# Patient Record
Sex: MALE | Race: WHITE
[De-identification: names, ages, dates, MRNs, and addresses within clinical notes are randomized per-mention and may not be internally consistent; named-entity substitution may affect disease eponyms.]

---

## 2019-10-28 NOTE — US
Limited abdominal ultrasound: Multiple real-time images of the right upper 
abdomen were obtained.



Liver shows no focal abnormality.  Pancreas is incompletely seen.  Visualized 
portions of the pancreas appear within normal limits.  Gallbladder shows no 
shadowing gallstones.  No gallbladder wall thickening or biliary duct 
dilatation is seen.  Right kidney shows no hydronephrosis or mass.  Right 
kidney length is 12.2 cm.



Impression: No abnormality is appreciated on right upper quadrant abdominal 
ultrasound exam.



Diagnostic code #1
MTDD

## 2019-10-28 NOTE — CR
Chest: Portable view of the chest was obtained.



Comparison: Prior chest x-ray 09/15/16.



Linear densities are seen within the right lung base most likely due to 
atelectasis.  Lungs otherwise are clear.  Heart size is within normal limits 
for portable technique.  Thoracic aorta appears within normal limits.  Slight 
degenerative change is noted within both shoulders as well as endplate spurring 
scattered within the spine.



Impression:

1.  Right basilar atelectasis.

2.  Nothing acute is otherwise seen.



Diagnostic code #2
MTDD

## 2019-10-28 NOTE — EDM.PDOC
ED HPI GENERAL MEDICAL PROBLEM





- General


Stated Complaint: UPPER ABDOMEN/CHEST PAIN


Time Seen by Provider: 10/28/19 10:11


Source of Information: Reports: Patient


History Limitations: Reports: No Limitations





- History of Present Illness


INITIAL COMMENTS - FREE TEXT/NARRATIVE: 


HISTORY AND PHYSICAL:





History of present illness:


Patient is a 74-year-old male who presents to the emergency room today with 

complaints of epigastric/chest pain. He states he has had three episodes, with 

the first one starting on Friday - which lasted anywhere from 15 minutes to an 

hour. Denies the pain being precipitated by eating or drinking. Reports that 

the area does feel tender during the episode. Has associated diaphoresis, nausea

, and dry heaves. Patient reports when the pain started on Friday he did call 

his son who is a physician, he encouraged him to take some omeprazole. Patient 

states he did take the omeprazole on Saturday evening. Today he went to 

breakfast and had decaf coffee and pancake; shortly after started having pain. 

Patient states he is concerned it could be his gallbladder; reports he has 

known gallstones but has never had any "real problem" with them before. Patient 

reports he is currently pain free.





Patient has a known history of atrial fibrillation (on warfarin) and previous 

stent placement.


Patient denies any fever, chills, headache, change in vision, syncope or near 

syncope. Denies any back pain, shortness of breath or cough. Denies any 

abdominal pain, nausea, vomiting, diarrhea, constipation or dysuria. Has not 

noted any blood in urine or stool. Patient has been eating and drinking 

appropriately.





Review of systems: 


As per history of present illness and below otherwise all systems reviewed and 

negative.





Past medical history: 


As per history of present illness and as reviewed below otherwise 

noncontributory.





Surgical history: 


As per history of present illness and as reviewed below otherwise 

noncontributory.





Social history: 


See social history for further information





Family history: 


As per history of present illness and as reviewed below otherwise 

noncontributory.





Physical exam:


General: Well developed and well nourished 74-year-old male. Alert and 

oriented. Nontoxic appearing and in no acute distress.


HEENT: Atraumatic, normocephalic, pupils equal and reactive bilaterally, 

negative for conjunctival pallor or scleral icterus, mucous membranes moist, 

trachea midline. No drooling or trismus noted. No meningeal signs. No hot 

potato voice noted. 


Lungs: Clear to auscultation, breath sounds equal bilaterally, chest nontender.


Heart: S1S2, regular rate and rhythm without overt murmur


Abdomen: Soft, nondistended, nontender. Negative for masses or 

hepatosplenomegaly. Negative for costovertebral tenderness.


Pelvis: Stable nontender.


Skin: Intact, warm, dry. No lesions or rashes noted.


Extremities: Atraumatic, moves all extremities per self without difficulty or 

deficits, negative for cords or calf pain. Neurovascular unremarkable.


Neuro: Awake, alert, oriented. Cranial nerves II through XII unremarkable. 

Cerebellum unremarkable. Motor and sensory unremarkable throughout. Exam 

nonfocal.





Notes:


Chest x-ray shows right basilar atelectasis. Nothing acute is otherwise seen. 

Ultrasound shows no acute findings. EKG shows the atrial fibrillation. 


Dr Garcia, hospitalist on call, was contacted on this patient. She will admit 

for observation for further care/management. Patient is aware and agreeable.  





Diagnostics:


CBC, CMP, Troponin, EKG, INR, CXR, Gallbladder US





Therapeutics:


NS at 125ml/hr, Protonix





Impression: 


Chest Pain


History of Atrial Fib


Epigastric Pain





Plan:


Observation admission to med/surg





Definitive disposition and diagnosis as appropriate pending reevaluation and 

review of above.








- Related Data


 Allergies











Allergy/AdvReac Type Severity Reaction Status Date / Time


 


latex Allergy  Itching Verified 10/28/19 13:26


 


oxycodone HCl Allergy  Stomach Verified 10/28/19 13:26





[From OxyContin]   Upset  











Home Meds: 


 Home Meds





Carvedilol [Coreg] 12.5 mg PO BID 03/21/16 [History]


Clopidogrel [Plavix] 75 mg PO DAILY 03/21/16 [History]


Nitroglycerin [Nitrostat] 0.4 mg PO Q5M PRN 03/21/16 [History]


Warfarin [Coumadin] 2.5 mg PO .MONTHUR 03/21/16 [History]


Warfarin [Coumadin] 5 mg PO .[id number] 03/21/16 [History]


atorvaSTATin [Lipitor] 40 mg PO BEDTIME 03/21/16 [History]


Oxybutynin 5 mg PO QAM 10/28/19 [History]











Past Medical History


Cardiovascular History: Reports: High Cholesterol, Hypertension, MI, Stents


Respiratory History: Reports: None


Gastrointestinal History: Reports: None


Genitourinary History: Reports: None


Neurological History: Reports: None


Psychiatric History: Reports: None


Endocrine/Metabolic History: Reports: None


Hematologic History: Reports: None


Immunologic History: Reports: None


Dermatologic History: Reports: None





- Past Surgical History


Head Surgeries/Procedures: Reports: None


HEENT Surgical History: Reports: Tonsillectomy


Musculoskeletal Surgical History: Reports: ORIF


Other Musculoskeletal Surgeries/Procedures:: R hip





ED ROS GENERAL





- Review of Systems


Review Of Systems: ROS reveals no pertinent complaints other than HPI.





ED EXAM, GENERAL





- Physical Exam


Exam: See Below (See dictation)





Course





- Vital Signs


Last Recorded V/S: 


 Last Vital Signs











Temp  97.9 F   10/28/19 12:03


 


Pulse  104 H  10/28/19 12:03


 


Resp  18   10/28/19 12:03


 


BP  102/63   10/28/19 12:03


 


Pulse Ox  98   10/28/19 12:03














- Orders/Labs/Meds


Orders: 


 Active Orders 24 hr











 Category Date Time Status


 


 Admission Status [Patient Status] [ADT] Stat ADT  10/28/19 11:36 Active


 


 Cardiac Monitoring [RC] .AS DIRECTED Care  10/28/19 11:36 Active


 


 Sodium Chloride 0.9% [Saline Flush] Med  10/28/19 10:16 Active





 10 ml FLUSH ASDIRECTED PRN   


 


 Sodium Chloride 0.9% [Saline Flush] Med  10/28/19 10:16 Active





 2.5 ml FLUSH ASDIRECTED PRN   


 


 Saline Lock Insert [OM.PC] Stat Oth  10/28/19 10:16 Ordered








 Medication Orders





Acetaminophen (Tylenol)  650 mg PO Q4H PRN


   PRN Reason: Pain (Mild 1-3)/fever


Atorvastatin Calcium (Lipitor)  40 mg PO BEDTIME [facility]O


Carvedilol (Coreg)  12.5 mg PO BID RAMO


Clopidogrel Bisulfate (Plavix)  75 mg PO DAILY [facility]O


Morphine Sulfate (Morphine)  2 mg IVPUSH Q2H PRN


   PRN Reason: Pain (severe 7-10)


   Stop: 10/29/19 12:02


Nitroglycerin (Nitrostat)  0.4 mg SL Q5M PRN


   PRN Reason: Chest Pain


Ondansetron HCl (Zofran Odt)  4 mg PO Q4H PRN


   PRN Reason: nausea, able to take PO


Ondansetron HCl (Zofran)  4 mg IVPUSH Q4H PRN


   PRN Reason: Nausea


Oxybutynin Chloride (Oxybutynin)  5 mg PO DAILY [facility]


Pantoprazole Sodium (Protonix***)  40 mg PO ACBREAKFAST [facility]


Sodium Chloride (Saline Flush)  10 ml FLUSH ASDIRECTED PRN


   PRN Reason: Keep Vein Open


Sodium Chloride (Saline Flush)  2.5 ml FLUSH ASDIRECTED PRN


   PRN Reason: Keep Vein Open


Warfarin Sodium (Coumadin Ask)  1 each PO DAILY@1400 [facility]


   Last Admin: 10/28/19 16:16  Dose:  


Warfarin Sodium (Coumadin)  5 mg PO DAILY@1800 [facility]


   Stop: 10/28/19 18:01








Labs: 


 Laboratory Tests











  10/28/19 10/28/19 10/28/19 Range/Units





  10:30 10:30 10:30 


 


WBC  9.35    (4.0-11.0)  K/uL


 


RBC  4.81    (4.50-5.90)  M/uL


 


Hgb  14.1    (13.0-17.0)  g/dL


 


Hct  42.9    (38.0-50.0)  %


 


MCV  89.2    (80.0-98.0)  fL


 


MCH  29.3    (27.0-32.0)  pg


 


MCHC  32.9    (31.0-37.0)  g/dL


 


RDW Std Deviation  47.2    (28.0-62.0)  fl


 


RDW Coeff of Eusebio  15    (11.0-15.0)  %


 


Plt Count  235    (150-400)  K/uL


 


MPV  10.10    (7.40-12.00)  fL


 


Add Manual Diff  YES    


 


Neutrophils % (Manual)  76    (48.0-80.0)  %


 


Lymphocytes % (Manual)  9 L    (16.0-40.0)  %


 


Monocytes % (Manual)  14    (0.0-15.0)  %


 


Eosinophils % (Manual)  1    (0.0-7.0)  %


 


Nucleated RBC %  0.0    /100WBC


 


Absolute Seg Neuts  7.1 H    (1.4-5.7)  


 


Lymphocytes # (Manual)  0.8    (0.6-2.4)  


 


Monocytes # (Manual)  1.3 H    (0.0-0.8)  


 


Eosinophils # (Manual)  0.1    (0.0-0.7)  


 


Nucleated RBCs #  0    K/uL


 


INR    2.11  


 


Sodium   134 L   (136-148)  mmol/L


 


Potassium   4.5   (3.5-5.1)  mmol/L


 


Chloride   98   ()  mmol/L


 


Carbon Dioxide   25.8   (21.0-32.0)  mmol/L


 


BUN   18   (7.0-18.0)  mg/dL


 


Creatinine   1.3   (0.8-1.3)  mg/dL


 


Est Cr Clr Drug Dosing   56.34   mL/min


 


Estimated GFR (MDRD)   54.0   ml/min


 


Glucose   119 H   ()  mg/dL


 


Hemoglobin A1c     (4.5-6.2)  %


 


Calcium   8.7   (8.5-10.1)  mg/dL


 


Total Bilirubin   1.4 H   (0.2-1.0)  mg/dL


 


AST   57 H   (15-37)  IU/L


 


ALT   56   (14-63)  IU/L


 


Alkaline Phosphatase   126 H   ()  U/L


 


Troponin I   < 0.050   (0.000-0.056)  ng/mL


 


Total Protein   7.9   (6.4-8.2)  g/dL


 


Albumin   2.8 L   (3.4-5.0)  g/dL


 


Globulin   5.1 H   (2.6-4.0)  g/dL


 


Albumin/Globulin Ratio   0.6 L   (0.9-1.6)  


 


Lipase   150   ()  U/L


 


TSH 3rd Generation     (0.36-3.74)  uIU/mL














  10/28/19 10/28/19 Range/Units





  10:30 10:30 


 


WBC    (4.0-11.0)  K/uL


 


RBC    (4.50-5.90)  M/uL


 


Hgb    (13.0-17.0)  g/dL


 


Hct    (38.0-50.0)  %


 


MCV    (80.0-98.0)  fL


 


MCH    (27.0-32.0)  pg


 


MCHC    (31.0-37.0)  g/dL


 


RDW Std Deviation    (28.0-62.0)  fl


 


RDW Coeff of Eusebio    (11.0-15.0)  %


 


Plt Count    (150-400)  K/uL


 


MPV    (7.40-12.00)  fL


 


Add Manual Diff    


 


Neutrophils % (Manual)    (48.0-80.0)  %


 


Lymphocytes % (Manual)    (16.0-40.0)  %


 


Monocytes % (Manual)    (0.0-15.0)  %


 


Eosinophils % (Manual)    (0.0-7.0)  %


 


Nucleated RBC %    /100WBC


 


Absolute Seg Neuts    (1.4-5.7)  


 


Lymphocytes # (Manual)    (0.6-2.4)  


 


Monocytes # (Manual)    (0.0-0.8)  


 


Eosinophils # (Manual)    (0.0-0.7)  


 


Nucleated RBCs #    K/uL


 


INR    


 


Sodium    (136-148)  mmol/L


 


Potassium    (3.5-5.1)  mmol/L


 


Chloride    ()  mmol/L


 


Carbon Dioxide    (21.0-32.0)  mmol/L


 


BUN    (7.0-18.0)  mg/dL


 


Creatinine    (0.8-1.3)  mg/dL


 


Est Cr Clr Drug Dosing    mL/min


 


Estimated GFR (MDRD)    ml/min


 


Glucose    ()  mg/dL


 


Hemoglobin A1c   6.1  (4.5-6.2)  %


 


Calcium    (8.5-10.1)  mg/dL


 


Total Bilirubin    (0.2-1.0)  mg/dL


 


AST    (15-37)  IU/L


 


ALT    (14-63)  IU/L


 


Alkaline Phosphatase    ()  U/L


 


Troponin I    (0.000-0.056)  ng/mL


 


Total Protein    (6.4-8.2)  g/dL


 


Albumin    (3.4-5.0)  g/dL


 


Globulin    (2.6-4.0)  g/dL


 


Albumin/Globulin Ratio    (0.9-1.6)  


 


Lipase    ()  U/L


 


TSH 3rd Generation  3.30   (0.36-3.74)  uIU/mL











Meds: 


Medications











Generic Name Dose Route Start Last Admin





  Trade Name Freq  PRN Reason Stop Dose Admin


 


Acetaminophen  650 mg  10/28/19 12:00  





  Tylenol  PO   





  Q4H PRN   





  Pain (Mild 1-3)/fever   





     





     





     


 


Atorvastatin Calcium  40 mg  10/28/19 21:00  





  Lipitor  PO   





  BEDTIME [facility]   





     





     





     





     


 


Carvedilol  12.5 mg  10/28/19 21:00  





  Coreg  PO   





  BID [facility]   





     





     





     





     


 


Clopidogrel Bisulfate  75 mg  10/29/19 09:00  





  Plavix  PO   





  DAILY [facility]   





     





     





     





     


 


Morphine Sulfate  2 mg  10/28/19 12:00  





  Morphine  IVPUSH  10/29/19 12:02  





  Q2H PRN   





  Pain (severe 7-10)   





     





     





     


 


Nitroglycerin  0.4 mg  10/28/19 12:07  





  Nitrostat  SL   





  Q5M PRN   





  Chest Pain   





     





     





     


 


Ondansetron HCl  4 mg  10/28/19 12:00  





  Zofran Odt  PO   





  Q4H PRN   





  nausea, able to take PO   





     





     





     


 


Ondansetron HCl  4 mg  10/28/19 12:00  





  Zofran  IVPUSH   





  Q4H PRN   





  Nausea   





     





     





     


 


Oxybutynin Chloride  5 mg  10/29/19 09:00  





  Oxybutynin  PO   





  DAILY [facility]   





     





     





     





     


 


Pantoprazole Sodium  40 mg  10/29/19 07:30  





  Protonix***  PO   





  ACBREAKFAST [facility]   





     





     





     





     


 


Sodium Chloride  10 ml  10/28/19 10:16  





  Saline Flush  FLUSH   





  ASDIRECTED PRN   





  Keep Vein Open   





     





     





     


 


Sodium Chloride  2.5 ml  10/28/19 10:16  





  Saline Flush  FLUSH   





  ASDIRECTED PRN   





  Keep Vein Open   





     





     





     


 


Warfarin Sodium  1 each  10/28/19 14:00  10/28/19 16:16





  Coumadin Ask  PO   Not Given





  DAILY@1400 [facility]   





     





     





     





     


 


Warfarin Sodium  5 mg  10/28/19 18:00  





  Coumadin  PO  10/28/19 18:01  





  DAILY@1800 [facility]   





     





     





     





     














Discontinued Medications














Generic Name Dose Route Start Last Admin





  Trade Name Freq  PRN Reason Stop Dose Admin


 


Aspirin  81 mg  10/29/19 09:00  





  Halfprin  PO   





  DAILY [facility]   





     





     





     





     


 


Al Hydroxide/Mg Hydroxide 15  0 ml  10/28/19 12:03  10/28/19 13:55





  ml/ Lidocaine HCl 5 ml  PO  10/28/19 12:04  Not Given





  ONETIME [facility]   





     





     





     





     


 


Sodium Chloride  1,000 mls @ 125 mls/hr  10/28/19 10:35  10/28/19 10:56





  Normal Saline  IV  10/28/19 18:34  125 mls/hr





  STAT ONE   Administration





     





     





     





     


 


Sodium Chloride  Confirm  10/28/19 10:51  10/28/19 13:08





  Normal Saline  Administered  10/28/19 10:52  Not Given





  Dose   





  20 mls @ as directed   





  .ROUTE   





  .STK-MED ONE   





     





     





     





     


 


Oxybutynin Chloride  5 mg  10/28/19 14:00  10/28/19 15:16





  Oxybutynin  PO   Not Given





  TID [facility]   





     





     





     





     


 


Pantoprazole Sodium  80 mg  10/28/19 10:35  10/28/19 10:56





  Protonix Iv***  IVPUSH  10/28/19 10:36  80 mg





  .BOLUS [facility]   Administration





     





     





     





     














Departure





- Departure


Time of Disposition: 16:17


Disposition: Refer to Observation


Clinical Impression: 


 Chest pain, rule out acute myocardial infarction, Epigastric abdominal pain








- My Orders


Last 24 Hours: 


My Active Orders





10/28/19 10:16


Sodium Chloride 0.9% [Saline Flush]   10 ml FLUSH ASDIRECTED PRN 


Sodium Chloride 0.9% [Saline Flush]   2.5 ml FLUSH ASDIRECTED PRN 


Saline Lock Insert [OM.PC] Stat 





10/28/19 11:36


Admission Status [Patient Status] [ADT] Stat 


Cardiac Monitoring [RC] .AS DIRECTED 














- Assessment/Plan


Last 24 Hours: 


My Active Orders





10/28/19 10:16


Sodium Chloride 0.9% [Saline Flush]   10 ml FLUSH ASDIRECTED PRN 


Sodium Chloride 0.9% [Saline Flush]   2.5 ml FLUSH ASDIRECTED PRN 


Saline Lock Insert [OM.PC] Stat 





10/28/19 11:36


Admission Status [Patient Status] [ADT] Stat 


Cardiac Monitoring [RC] .AS DIRECTED

## 2019-10-28 NOTE — PCM.HP.2
H&P History of Present Illness





- General


Date of Service: 10/28/19


Admit Problem/Dx: 


 Admission Diagnosis/Problem





Admission Diagnosis/Problem      Chest pain, rule out acute myocardial 

infarction











- History of Present Illness


Initial Comments - Free Text/Narative: 





75 y/o male with history of CAD s/p stent placement. Presenting to the ER 

complaining of epigastric pain for the past 3-4 days. Patient states that he 

started having sharp epigastric pain on Friday night when laying down. Took 

some Tums and pain resolved. Pain is intermittent, worse after eating. Had pain 

this morning after eating some pancakes. No vomiting but does endorse some dry 

heaves. No diaphoresis, radiation to neck or arms. No trauma to abdomen or 

chest. denies any dysuria, diarrhea. Has been taking some omeprazole since 

yesterday. No chest pain or dyspnea with exertion.





In the ER, patient was asymptomatic. No chest pain. Troponin was negative. EKG 

showed Afib, rate controlled. No acute ST changes. Chest xray showed right 

basilar atelectasis. RUQ U/S was negative for any gallstones.





- Related Data


Allergies/Adverse Reactions: 


 Allergies











Allergy/AdvReac Type Severity Reaction Status Date / Time


 


latex Allergy  Itching Verified 10/28/19 13:26


 


oxycodone HCl Allergy  Stomach Verified 10/28/19 13:26





[From OxyContin]   Upset  











Home Medications: 


 Home Meds





Carvedilol [Coreg] 12.5 mg PO BID 03/21/16 [History]


Clopidogrel [Plavix] 75 mg PO DAILY 03/21/16 [History]


Nitroglycerin [Nitrostat] 0.4 mg PO Q5M PRN 03/21/16 [History]


Warfarin [Coumadin] 2.5 mg PO .MONTHUR 03/21/16 [History]


Warfarin [Coumadin] 5 mg PO .TUESWEDFRISATSUN 03/21/16 [History]


atorvaSTATin [Lipitor] 40 mg PO BEDTIME 03/21/16 [History]


Oxybutynin 5 mg PO QAM 10/28/19 [History]











Past Medical History


Cardiovascular History: Reports: High Cholesterol, Hypertension, MI, Stents


Respiratory History: Reports: None


Gastrointestinal History: Reports: Other (See Below)


Other Gastrointestinal History: heartburn if overeating


Genitourinary History: Reports: Prostate Disorder


Other Genitourinary History: states has had prostate surgery for cancer in 1999


Neurological History: Reports: None


Psychiatric History: Reports: None


Endocrine/Metabolic History: Reports: None


Hematologic History: Reports: None


Immunologic History: Reports: None


Oncologic (Cancer) History: Reports: Prostate


Dermatologic History: Reports: None





- Infectious Disease History


Infectious Disease History: Reports: Chicken Pox





- Past Surgical History


Head Surgeries/Procedures: Reports: None


HEENT Surgical History: Reports: Tonsillectomy


Male  Surgical History: Reports: Prostatectomy


Other Male  Surgeries/Procedures: urinates frequently in small ammounts.


Musculoskeletal Surgical History: Reports: Hip Replacement


Other Musculoskeletal Surgeries/Procedures:: R hip





Social & Family History





- Tobacco Use


Smoking Status *Q: Former Smoker


Years of Tobacco use: 20


Used Tobacco, but Quit: Yes


Month/Year Tobacco Last Used: 1999 december


Second Hand Smoke Exposure: No





- Caffeine Use


Caffeine Use: Reports: Coffee, Soda





- Alcohol Use


Days Per Week of Alcohol Use: 1


Number of Drinks Per Day: 1


Total Drinks Per Week: 1


Date of Last Drink: 09/25/19





- Recreational Drug Use


Recreational Drug Use: No





H&P Review of Systems





- Review of Systems:


Review Of Systems: ROS reveals no pertinent complaints other than HPI.





Exam





- Exam


Exam: See Below





- Vital Signs


Vital Signs: 


 Last Vital Signs











Temp  36.6 C   10/28/19 12:00


 


Pulse  104 H  10/28/19 12:00


 


Resp  16   10/28/19 12:00


 


BP  110/69   10/28/19 12:00


 


Pulse Ox  97   10/28/19 12:00











Weight: 36.6 kg





- Exam


General: Alert, Oriented, Cooperative


HEENT: Mucosa Moist & Pink


Lungs: Clear to Auscultation, Normal Respiratory Effort.  No: Crackles, Wheezing


Cardiovascular: Irregular Rhythm


GI/Abdominal Exam: Normal Bowel Sounds, Soft, Non-Tender


Extremities: Normal Inspection, No Pedal Edema


Skin: Warm, Dry


Neuro Extensive - Mental Status: Alert, Oriented x3





- Patient Data


Lab Results Last 24 hrs: 


 Laboratory Results - last 24 hr











  10/28/19 10/28/19 10/28/19 Range/Units





  10:30 10:30 10:30 


 


WBC  9.35    (4.0-11.0)  K/uL


 


RBC  4.81    (4.50-5.90)  M/uL


 


Hgb  14.1    (13.0-17.0)  g/dL


 


Hct  42.9    (38.0-50.0)  %


 


MCV  89.2    (80.0-98.0)  fL


 


MCH  29.3    (27.0-32.0)  pg


 


MCHC  32.9    (31.0-37.0)  g/dL


 


RDW Std Deviation  47.2    (28.0-62.0)  fl


 


RDW Coeff of Eusebio  15    (11.0-15.0)  %


 


Plt Count  235    (150-400)  K/uL


 


MPV  10.10    (7.40-12.00)  fL


 


Add Manual Diff  YES    


 


Neutrophils % (Manual)  76    (48.0-80.0)  %


 


Lymphocytes % (Manual)  9 L    (16.0-40.0)  %


 


Monocytes % (Manual)  14    (0.0-15.0)  %


 


Eosinophils % (Manual)  1    (0.0-7.0)  %


 


Nucleated RBC %  0.0    /100WBC


 


Absolute Seg Neuts  7.1 H    (1.4-5.7)  


 


Lymphocytes # (Manual)  0.8    (0.6-2.4)  


 


Monocytes # (Manual)  1.3 H    (0.0-0.8)  


 


Eosinophils # (Manual)  0.1    (0.0-0.7)  


 


Nucleated RBCs #  0    K/uL


 


INR    2.11  


 


Sodium   134 L   (136-148)  mmol/L


 


Potassium   4.5   (3.5-5.1)  mmol/L


 


Chloride   98   ()  mmol/L


 


Carbon Dioxide   25.8   (21.0-32.0)  mmol/L


 


BUN   18   (7.0-18.0)  mg/dL


 


Creatinine   1.3   (0.8-1.3)  mg/dL


 


Est Cr Clr Drug Dosing   56.34   mL/min


 


Estimated GFR (MDRD)   54.0   ml/min


 


Glucose   119 H   ()  mg/dL


 


Hemoglobin A1c     (4.5-6.2)  %


 


Calcium   8.7   (8.5-10.1)  mg/dL


 


Total Bilirubin   1.4 H   (0.2-1.0)  mg/dL


 


AST   57 H   (15-37)  IU/L


 


ALT   56   (14-63)  IU/L


 


Alkaline Phosphatase   126 H   ()  U/L


 


Troponin I   < 0.050   (0.000-0.056)  ng/mL


 


Total Protein   7.9   (6.4-8.2)  g/dL


 


Albumin   2.8 L   (3.4-5.0)  g/dL


 


Globulin   5.1 H   (2.6-4.0)  g/dL


 


Albumin/Globulin Ratio   0.6 L   (0.9-1.6)  


 


Lipase   150   ()  U/L


 


TSH 3rd Generation     (0.36-3.74)  uIU/mL














  10/28/19 10/28/19 Range/Units





  10:30 10:30 


 


WBC    (4.0-11.0)  K/uL


 


RBC    (4.50-5.90)  M/uL


 


Hgb    (13.0-17.0)  g/dL


 


Hct    (38.0-50.0)  %


 


MCV    (80.0-98.0)  fL


 


MCH    (27.0-32.0)  pg


 


MCHC    (31.0-37.0)  g/dL


 


RDW Std Deviation    (28.0-62.0)  fl


 


RDW Coeff of Eusebio    (11.0-15.0)  %


 


Plt Count    (150-400)  K/uL


 


MPV    (7.40-12.00)  fL


 


Add Manual Diff    


 


Neutrophils % (Manual)    (48.0-80.0)  %


 


Lymphocytes % (Manual)    (16.0-40.0)  %


 


Monocytes % (Manual)    (0.0-15.0)  %


 


Eosinophils % (Manual)    (0.0-7.0)  %


 


Nucleated RBC %    /100WBC


 


Absolute Seg Neuts    (1.4-5.7)  


 


Lymphocytes # (Manual)    (0.6-2.4)  


 


Monocytes # (Manual)    (0.0-0.8)  


 


Eosinophils # (Manual)    (0.0-0.7)  


 


Nucleated RBCs #    K/uL


 


INR    


 


Sodium    (136-148)  mmol/L


 


Potassium    (3.5-5.1)  mmol/L


 


Chloride    ()  mmol/L


 


Carbon Dioxide    (21.0-32.0)  mmol/L


 


BUN    (7.0-18.0)  mg/dL


 


Creatinine    (0.8-1.3)  mg/dL


 


Est Cr Clr Drug Dosing    mL/min


 


Estimated GFR (MDRD)    ml/min


 


Glucose    ()  mg/dL


 


Hemoglobin A1c   6.1  (4.5-6.2)  %


 


Calcium    (8.5-10.1)  mg/dL


 


Total Bilirubin    (0.2-1.0)  mg/dL


 


AST    (15-37)  IU/L


 


ALT    (14-63)  IU/L


 


Alkaline Phosphatase    ()  U/L


 


Troponin I    (0.000-0.056)  ng/mL


 


Total Protein    (6.4-8.2)  g/dL


 


Albumin    (3.4-5.0)  g/dL


 


Globulin    (2.6-4.0)  g/dL


 


Albumin/Globulin Ratio    (0.9-1.6)  


 


Lipase    ()  U/L


 


TSH 3rd Generation  3.30   (0.36-3.74)  uIU/mL











Result Diagrams: 


 10/28/19 10:30





 10/28/19 10:30


Problem List Initiated/Reviewed/Updated: Yes


Orders Last 24hrs: 


 Active Orders 24 hr











 Category Date Time Status


 


 Admission Status [Patient Status] [ADT] Stat ADT  10/28/19 11:36 Active


 


 Bedrest Bathroom Privileges [RC] ASDIRECTED Care  10/28/19 12:00 Inactive


 


 Cardiac Monitoring [RC] .AS DIRECTED Care  10/28/19 11:36 Active


 


 Intake and Output [RC] Q12H Care  10/28/19 12:00 Active


 


 Oxygen Therapy [RC] PRN Care  10/28/19 12:00 Active


 


 Up With Assistance [RC] ASDIRECTED Care  10/28/19 13:06 Active


 


 VTE/DVT Education [RC] PER UNIT ROUTINE Care  10/28/19 12:00 Active


 


 Vital Signs [RC] Q4H Care  10/28/19 12:00 Active


 


 Heart Healthy Diet [DIET] Diet  10/28/19 Lunch Active


 


 CBC WITH AUTO DIFF [HEME] AM Lab  10/29/19 05:11 Ordered


 


 COMPREHENSIVE METABOLIC PN,CMP [CHEM] AM Lab  10/29/19 05:11 Ordered


 


 INR,PT,PROTHROMBIN TIME [COAG] AM Lab  10/29/19 05:11 Ordered


 


 TROPONIN I [CHEM] Q3H Lab  10/28/19 14:00 Ordered


 


 TROPONIN I [CHEM] Q3H Lab  10/28/19 17:00 Ordered


 


 TROPONIN I [CHEM] Q3H Lab  10/28/19 20:00 Ordered


 


 Acetaminophen [Tylenol] Med  10/28/19 12:00 Active





 650 mg PO Q4H PRN   


 


 Aspirin [Halfprin] Med  10/29/19 09:00 Active





 81 mg PO DAILY   


 


 Carvedilol [Coreg] Med  10/28/19 21:00 Active





 12.5 mg PO BID   


 


 Clopidogrel [Plavix] Med  10/29/19 09:00 Active





 75 mg PO DAILY   


 


 Morphine Med  10/28/19 12:00 Active





 2 mg IVPUSH Q2H PRN   


 


 Nitroglycerin [Nitrostat] Med  10/28/19 12:07 Active





 0.4 mg SL Q5M PRN   


 


 Ondansetron [Zofran ODT] Med  10/28/19 12:00 Active





 4 mg PO Q4H PRN   


 


 Ondansetron [Zofran] Med  10/28/19 12:00 Active





 4 mg IVPUSH Q4H PRN   


 


 Oxybutynin Med  10/28/19 14:00 Active





 5 mg PO TID   


 


 Pantoprazole [ProTONIX***] Med  10/29/19 07:30 Active





 40 mg PO ACBREAKFAST   


 


 Sodium Chloride 0.9% [Normal Saline] 1,000 ml Med  10/28/19 10:35 Active





 IV STAT   


 


 Sodium Chloride 0.9% [Saline Flush] Med  10/28/19 10:16 Active





 10 ml FLUSH ASDIRECTED PRN   


 


 Sodium Chloride 0.9% [Saline Flush] Med  10/28/19 10:16 Active





 2.5 ml FLUSH ASDIRECTED PRN   


 


 Warfarin Dosing [Coumadin Ask] Med  10/28/19 14:00 Active





 1 each PO DAILY@1400   


 


 Warfarin [Coumadin] Med  10/28/19 18:00 Once





 5 mg PO DAILY@1800 ONE   


 


 atorvaSTATin [Lipitor] Med  10/28/19 21:00 Active





 40 mg PO BEDTIME   


 


 Saline Lock Insert [OM.PC] Stat Oth  10/28/19 10:16 Ordered


 


 Resuscitation Status Routine Resus Stat  10/28/19 12:00 Ordered








 Medication Orders





Acetaminophen (Tylenol)  650 mg PO Q4H PRN


   PRN Reason: Pain (Mild 1-3)/fever


Aspirin (Halfprin)  81 mg PO DAILY RAMO


Atorvastatin Calcium (Lipitor)  40 mg PO BEDTIME RAMO


Carvedilol (Coreg)  12.5 mg PO BID RAMO


Clopidogrel Bisulfate (Plavix)  75 mg PO DAILY RAMO


Sodium Chloride (Normal Saline)  1,000 mls @ 125 mls/hr IV STAT ONE


   Stop: 10/28/19 18:34


   Last Admin: 10/28/19 10:56  Dose: 125 mls/hr


Morphine Sulfate (Morphine)  2 mg IVPUSH Q2H PRN


   PRN Reason: Pain (severe 7-10)


   Stop: 10/29/19 12:02


Nitroglycerin (Nitrostat)  0.4 mg SL Q5M PRN


   PRN Reason: Chest Pain


Ondansetron HCl (Zofran Odt)  4 mg PO Q4H PRN


   PRN Reason: nausea, able to take PO


Ondansetron HCl (Zofran)  4 mg IVPUSH Q4H PRN


   PRN Reason: Nausea


Oxybutynin Chloride (Oxybutynin)  5 mg PO TID RAMO


Pantoprazole Sodium (Protonix***)  40 mg PO ACBREAKFAST [facility]


Sodium Chloride (Saline Flush)  10 ml FLUSH ASDIRECTED PRN


   PRN Reason: Keep Vein Open


Sodium Chloride (Saline Flush)  2.5 ml FLUSH ASDIRECTED PRN


   PRN Reason: Keep Vein Open


Warfarin Sodium (Coumadin Ask)  1 each PO DAILY@1400 [facility]


Warfarin Sodium (Coumadin)  5 mg PO DAILY@1800 ONE


   Stop: 10/28/19 18:01








Assessment/Plan Comment:: 





A:


1. Epigastric pain, ACS rule out


2. Afib, on warfarin


3. PMD CAD s/p stents, hypertension





P:


1. Epigastric pain. Suspect PUD, however since patient is high risk due to his 

cardiac history, we will trend troponins Q3H x3. Telemetry. Aspirin, Morphine, 

Nitroglycerin. Ordered Echo, HgA1c, TSH, lipid panel. Pantoprazole 40 mg PO 

daily. GI cocktail PRN.


2. Afib, rate controlled. Continue home dose of warfarin. Monitor INR daily. 

Continue Carvedilol.


3. PMH CAD s/p stents, HTN- will resume home medications.





Dispo: 1-2 days

## 2019-10-29 NOTE — [id number]
**Discharge Summary





- Hospital Course


Free Text/Narrative:: 





73 y/o male with history of CAD s/p stent placement, Afib on warfarin who 

presented to the ER complaining of epigastric pain for the past 1 week. Worse 

at night after eating and laying down. Recently started taking omeprazole but 

did not work. He was admitted for ACS rule out due to his high risk and cardiac 

history. EKG was negative for any ST changes. Serial troponins were negative. 

Patient was asymptomatic during this hospitalization.  No chest pain or 

epigastric pain at time of discharge. Echo showed some mild/moderate mitral 

regurgitation.  His symptoms were attributed to PUD and was discharged home on 

pantoprazole 40 mg PO daily. In addition, he would benefit from an outpatient 

stress test due to his cardiac history and upper endoscopy if symptoms do not 

resolve with PPI. He was advised to follow-up with his PCP in 1-2 weeks.











- Discharge Data


Discharge Date: 10/29/19


Discharge Disposition: Home, Self-Care 01


Condition: Good





- Referral to Home Health


Primary Care Physician: 


Del Carmona MD








- Patient Instructions


Diet: Heart Healthy Diet


Activity: As Tolerated


Notify Provider of: Fever, Increased Pain, Swelling and Redness, Nausea and/or 

Vomiting





- Discharge Plan


*PRESCRIPTION DRUG MONITORING PROGRAM REVIEWED*: Not Applicable


*COPY OF PRESCRIPTION DRUG MONITORING REPORT IN PATIENT NATALIE: Not Applicable


Prescriptions/Med Rec: 


atorvaSTATin [Lipitor] 40 mg PO BEDTIME 30 Days #30 tablet


Pantoprazole [ProTONIX***] 40 mg PO ACBREAKFAST 30 Days #30 tab.cr


Home Medications: 


 Home Meds





Carvedilol [Coreg] 12.5 mg PO BID 03/21/16 [History]


Nitroglycerin [Nitrostat] 0.4 mg PO Q5M PRN 03/21/16 [History]


Warfarin [Coumadin] 2.5 mg PO .MONTHUR 03/21/16 [History]


Warfarin [Coumadin] 5 mg PO .TUESWEDFRISATSUN 03/21/16 [History]


atorvaSTATin [Lipitor] 40 mg PO BEDTIME 03/21/16 [History]


Oxybutynin 5 mg PO QAM 10/28/19 [History]


Pantoprazole [ProTONIX***] 40 mg PO ACBREAKFAST 30 Days #30 tab.cr 10/29/19 [Rx]


atorvaSTATin [Lipitor] 40 mg PO BEDTIME 30 Days #30 tablet 10/29/19 [Rx]








Patient Handouts:  Peptic Ulcer, Nonspecific Chest Pain, Atorvastatin tablets, 

Pantoprazole tablets


Forms:  ED Department Discharge


Referrals: 


Del Carmona MD [Primary Care Provider] - 11/06/19 3:15 pm





- Discharge Summary/Plan Comment


DC Time >30 min.: No





- Patient Data


Vitals - Most Recent: 


 Last Vital Signs











Temp  36.3 C   10/29/19 11:00


 


Pulse  78   10/29/19 11:00


 


Resp  18   10/29/19 11:00


 


BP  121/52 L  10/29/19 11:00


 


Pulse Ox  95   10/29/19 11:00











Weight - Most Recent: 36.6 kg


I&O - Last 24 hours: 


 Intake & Output











 10/28/19 10/29/19 10/29/19





 22:59 06:59 14:59


 


Intake Total 1125 840 400


 


Output Total 1200 800 750


 


Balance -75 40 -350











Lab Results - Last 24 hrs: 


 Laboratory Results - last 24 hr











  10/28/19 10/28/19 10/28/19 Range/Units





  14:00 17:08 20:00 


 


WBC     (4.0-11.0)  K/uL


 


RBC     (4.50-5.90)  M/uL


 


Hgb     (13.0-17.0)  g/dL


 


Hct     (38.0-50.0)  %


 


MCV     (80.0-98.0)  fL


 


MCH     (27.0-32.0)  pg


 


MCHC     (31.0-37.0)  g/dL


 


RDW Std Deviation     (28.0-62.0)  fl


 


RDW Coeff of Eusebio     (11.0-15.0)  %


 


Plt Count     (150-400)  K/uL


 


MPV     (7.40-12.00)  fL


 


Add Manual Diff     


 


Neutrophils % (Manual)     (48.0-80.0)  %


 


Band Neutrophils %     %


 


Lymphocytes % (Manual)     (16.0-40.0)  %


 


Monocytes % (Manual)     (0.0-15.0)  %


 


Eosinophils % (Manual)     (0.0-7.0)  %


 


Basophils % (Manual)     (0.0-1.5)  %


 


Nucleated RBC %     /100WBC


 


Absolute Seg Neuts     (1.4-5.7)  


 


Band Neutrophils #     


 


Lymphocytes # (Manual)     (0.6-2.4)  


 


Monocytes # (Manual)     (0.0-0.8)  


 


Eosinophils # (Manual)     (0.0-0.7)  


 


Basophils # (Manual)     (0.0-0.1)  


 


Nucleated RBCs #     K/uL


 


INR     


 


Sodium     (136-148)  mmol/L


 


Potassium     (3.5-5.1)  mmol/L


 


Chloride     ()  mmol/L


 


Carbon Dioxide     (21.0-32.0)  mmol/L


 


BUN     (7.0-18.0)  mg/dL


 


Creatinine     (0.8-1.3)  mg/dL


 


Est Cr Clr Drug Dosing     mL/min


 


Estimated GFR (MDRD)     ml/min


 


Glucose     ()  mg/dL


 


Calcium     (8.5-10.1)  mg/dL


 


Total Bilirubin     (0.2-1.0)  mg/dL


 


AST     (15-37)  IU/L


 


ALT     (14-63)  IU/L


 


Alkaline Phosphatase     ()  U/L


 


Troponin I  < 0.050  < 0.050  < 0.050  (0.000-0.056)  ng/mL


 


Total Protein     (6.4-8.2)  g/dL


 


Albumin     (3.4-5.0)  g/dL


 


Globulin     (2.6-4.0)  g/dL


 


Albumin/Globulin Ratio     (0.9-1.6)  


 


Triglycerides     (0-200)  mg/dL


 


Cholesterol     ()  mg/dL


 


LDL Cholesterol, Calc     ()  mg/dL


 


VLDL Cholesterol     (5-55)  mg/dL


 


HDL Cholesterol     (40-60)  mg/dL


 


Cholesterol/HDL Ratio     (3.3-6.0)  














  10/29/19 10/29/19 10/29/19 Range/Units





  05:18 05:18 05:18 


 


WBC   7.42   (4.0-11.0)  K/uL


 


RBC   4.73   (4.50-5.90)  M/uL


 


Hgb   13.4   (13.0-17.0)  g/dL


 


Hct   41.5   (38.0-50.0)  %


 


MCV   87.7   (80.0-98.0)  fL


 


MCH   28.3   (27.0-32.0)  pg


 


MCHC   32.3   (31.0-37.0)  g/dL


 


RDW Std Deviation   46.9   (28.0-62.0)  fl


 


RDW Coeff of Eusebio   14   (11.0-15.0)  %


 


Plt Count   238   (150-400)  K/uL


 


MPV   9.90   (7.40-12.00)  fL


 


Add Manual Diff   YES   


 


Neutrophils % (Manual)   47 L   (48.0-80.0)  %


 


Band Neutrophils %   8   %


 


Lymphocytes % (Manual)   14 L   (16.0-40.0)  %


 


Monocytes % (Manual)   27 H   (0.0-15.0)  %


 


Eosinophils % (Manual)   2   (0.0-7.0)  %


 


Basophils % (Manual)   2 H   (0.0-1.5)  %


 


Nucleated RBC %   0.0   /100WBC


 


Absolute Seg Neuts   3.5   (1.4-5.7)  


 


Band Neutrophils #   0.6   


 


Lymphocytes # (Manual)   1.0   (0.6-2.4)  


 


Monocytes # (Manual)   2.0 H   (0.0-0.8)  


 


Eosinophils # (Manual)   0.1   (0.0-0.7)  


 


Basophils # (Manual)   0.1   (0.0-0.1)  


 


Nucleated RBCs #   0   K/uL


 


INR  2.14    


 


Sodium    137  (136-148)  mmol/L


 


Potassium    4.0  (3.5-5.1)  mmol/L


 


Chloride    104  ()  mmol/L


 


Carbon Dioxide    26.3  (21.0-32.0)  mmol/L


 


BUN    14  (7.0-18.0)  mg/dL


 


Creatinine    1.0  (0.8-1.3)  mg/dL


 


Est Cr Clr Drug Dosing    33.55  mL/min


 


Estimated GFR (MDRD)    > 60.0  ml/min


 


Glucose    99  ()  mg/dL


 


Calcium    8.4 L  (8.5-10.1)  mg/dL


 


Total Bilirubin    0.7  (0.2-1.0)  mg/dL


 


AST    43 H  (15-37)  IU/L


 


ALT    62  (14-63)  IU/L


 


Alkaline Phosphatase    119 H  ()  U/L


 


Troponin I     (0.000-0.056)  ng/mL


 


Total Protein    7.1  (6.4-8.2)  g/dL


 


Albumin    2.6 L  (3.4-5.0)  g/dL


 


Globulin    4.5 H  (2.6-4.0)  g/dL


 


Albumin/Globulin Ratio    0.6 L  (0.9-1.6)  


 


Triglycerides    72  (0-200)  mg/dL


 


Cholesterol    99  ()  mg/dL


 


LDL Cholesterol, Calc    62  ()  mg/dL


 


VLDL Cholesterol    14  (5-55)  mg/dL


 


HDL Cholesterol    23 L  (40-60)  mg/dL


 


Cholesterol/HDL Ratio    4.3  (3.3-6.0)  











Med Orders - Current: 


 Current Medications








Discontinued Medications





Acetaminophen (Tylenol)  650 mg PO Q4H PRN


   PRN Reason: Pain (Mild 1-3)/fever


Aspirin (Halfprin)  81 mg PO DAILY [facility]


Atorvastatin Calcium (Lipitor)  40 mg PO BEDTIME [facility]


   Last Admin: 10/28/19 20:12 Dose:  40 mg


Carvedilol (Coreg)  12.5 mg PO BID [facility]


   Last Admin: 10/29/19 08:14 Dose:  12.5 mg


Clopidogrel Bisulfate (Plavix)  75 mg PO DAILY [facility]


Al Hydroxide/Mg Hydroxide 15 (ml/ Lidocaine HCl 5 ml)  0 ml PO ONETIME ONE


   Stop: 10/28/19 12:04


   Last Admin: 10/28/19 13:55 Dose:  Not Given


Sodium Chloride (Normal Saline)  1,000 mls @ 125 mls/hr IV STAT ONE


   Stop: 10/28/19 18:34


   Last Admin: 10/28/19 10:56 Dose:  125 mls/hr


Sodium Chloride (Normal Saline) Confirm Administered Dose 20 mls @ as directed 

.ROUTE .STK-MED ONE


   Stop: 10/28/19 10:52


   Last Admin: 10/28/19 13:08 Dose:  Not Given


Morphine Sulfate (Morphine)  2 mg IVPUSH Q2H PRN


   PRN Reason: Pain (severe 7-10)


   Stop: 10/29/19 12:02


Morphine Sulfate (Morphine)  2 mg IVPUSH Q2H PRN


   PRN Reason: Pain (severe 7-10)


   Stop: 10/29/19 12:02


Nitroglycerin (Nitrostat)  0.4 mg SL Q5M PRN


   PRN Reason: Chest Pain


Ondansetron HCl (Zofran Odt)  4 mg PO Q4H PRN


   PRN Reason: nausea, able to take PO


Ondansetron HCl (Zofran)  4 mg IVPUSH Q4H PRN


   PRN Reason: Nausea


Oxybutynin Chloride (Oxybutynin)  5 mg PO TID [facility]


   Last Admin: 10/28/19 15:16 Dose:  Not Given


Oxybutynin Chloride (Oxybutynin)  5 mg PO DAILY [facility]


   Last Admin: 10/29/19 08:14 Dose:  5 mg


Pantoprazole Sodium (Protonix Iv***)  80 mg IVPUSH .BOLUS ONE


   Stop: 10/28/19 10:36


   Last Admin: 10/28/19 10:56 Dose:  80 mg


Pantoprazole Sodium (Protonix***)  40 mg PO ACBREAKFAST [facility]


   Last Admin: 10/29/19 08:14 Dose:  40 mg


Sodium Chloride (Saline Flush)  10 ml FLUSH ASDIRECTED PRN


   PRN Reason: Keep Vein Open


Sodium Chloride (Saline Flush)  2.5 ml FLUSH ASDIRECTED PRN


   PRN Reason: Keep Vein Open


Warfarin Sodium (Coumadin Ask)  1 each PO DAILY@1400 [facility]


   Last Admin: 10/28/19 16:16 Dose:  Not Given


Warfarin Sodium (Coumadin)  2.5 mg PO DAILY@1800 ONE


   Stop: 10/28/19 18:01


   Last Admin: 10/28/19 17:43 Dose:  2.5 mg


Warfarin Sodium (Coumadin)  5 mg PO DAILY@1800 ONE


   Stop: 10/29/19 18:01
Continue current regimen and medications.  Pending: Medical Clearance

## 2019-10-29 NOTE — ECHO
EXAM DATE: 10/28/19



PATIENT'S AGE: 74





The echocardiogram report can be seen in this patient's EMR (Electronic Medical 
Record) in the Reports section. The report has also been scanned into PACs. 



ROSEY

## 2019-11-09 NOTE — EDM.PDOC
ED HPI GENERAL MEDICAL PROBLEM





- General


Stated Complaint: CHEST PAIN


Time Seen by Provider: 11/09/19 08:21


Source of Information: Reports: Patient


History Limitations: Reports: No Limitations





- History of Present Illness


INITIAL COMMENTS - FREE TEXT/NARRATIVE: 


History of present illness:


[]Patient woke up at 4 AM this morning with 8/10 right-sided dull chest pain 

with sweating. Patient does have coronary artery disease and has a stent. He 

denies any recent illnesses with fevers, chills, cough, abdominal pain, 

vomiting or diarrhea.





Review of systems: 


As per history of present illness and below otherwise all systems reviewed and 

negative.





Past medical history: 


As per history of present illness and as reviewed below otherwise 

noncontributory.





Surgical history: 


As per history of present illness and as reviewed below otherwise 

noncontributory.





Social history: 


No reported history of drug or alcohol abuse.





Family history: 


As per history of present illness and as reviewed below otherwise 

noncontributory.





Physical exam:


General: Well developed, well nourished in NAD


HEENT: Atraumatic, normocephalic, pupils reactive, negative for conjunctival 

pallor or scleral icterus, mucous membranes moist, throat clear, neck supple, 

nontender, trachea midline.


Lungs: Clear to auscultation, breath sounds equal bilaterally, chest nontender.


Heart: S1S2, regular, negative for clicks, rubs, or JVD.


Abdomen: NABS, Soft, nondistended, nontender. Negative for masses or 

hepatosplenomegaly. Negative for costovertebral tenderness.


Pelvis: Stable nontender.


Genitourinary: Deferred.


Rectal: Deferred.


Extremities: Atraumatic, negative for cords or calf pain. Neurovascular 

unremarkable.


Neuro: Awake, alert, oriented. Cranial nerves II through XII unremarkable. 

Cerebellum unremarkable. Motor and sensory unremarkable throughout. Exam 

nonfocal.


Skin:warm and dry





Diagnostics:


EKG, chest x-ray, CBC, chemistry, troponin, inr





Therapeutics:


ivf, levaquin, nitro





ED Course:


stable





Impression: 


chest apin





Prescriptions:


none





Plan:


admit to hopitalist





Definitive disposition and diagnosis as appropriate pending reevaluation and 

review of above.





  ** chest


Pain Score (Numeric/FACES): 2





- Related Data


 Allergies











Allergy/AdvReac Type Severity Reaction Status Date / Time


 


latex Allergy  Itching Verified 11/09/19 10:07


 


oxycodone HCl Allergy  Stomach Verified 11/09/19 10:07





[From OxyContin]   Upset  











Home Meds: 


 Home Meds





Carvedilol [Coreg] 12.5 mg PO BID 03/21/16 [History]


Nitroglycerin [Nitrostat] 0.4 mg PO Q5M PRN 03/21/16 [History]


Warfarin [Coumadin] 2.5 mg PO .MONTHUR 03/21/16 [History]


Warfarin [Coumadin] 5 mg PO .TUESWEDFRISATSUN 03/21/16 [History]


atorvaSTATin [Lipitor] 40 mg PO BEDTIME 03/21/16 [History]


Oxybutynin 5 mg PO QAM 10/28/19 [History]


Clopidogrel [Plavix] 75 mg PO DAILY 11/09/19 [History]


Digoxin [Lanoxin] 250 mcg PO DAILY 11/09/19 [History]











Past Medical History


Cardiovascular History: Reports: High Cholesterol, Hypertension, MI, Stents


Respiratory History: Reports: None


Gastrointestinal History: Reports: None


Other Gastrointestinal History: heartburn if overeating


Genitourinary History: Reports: None


Other Genitourinary History: states has had prostate surgery for cancer in 1999


Neurological History: Reports: None


Psychiatric History: Reports: None


Endocrine/Metabolic History: Reports: None


Hematologic History: Reports: None


Immunologic History: Reports: None


Oncologic (Cancer) History: Reports: Prostate


Dermatologic History: Reports: None





- Infectious Disease History


Infectious Disease History: Reports: Chicken Pox





- Past Surgical History


Head Surgeries/Procedures: Reports: None


HEENT Surgical History: Reports: Tonsillectomy


Musculoskeletal Surgical History: Reports: ORIF


Other Musculoskeletal Surgeries/Procedures:: R hip





Social & Family History





- Caffeine Use


Caffeine Use: Reports: Coffee, Soda





ED ROS GENERAL





- Review of Systems


Review Of Systems: See Below





ED EXAM, GENERAL





- Physical Exam


Exam: See Below





Course





- Vital Signs


Last Recorded V/S: 


 Last Vital Signs











Temp  97.2 F   11/09/19 10:06


 


Pulse  81   11/09/19 10:06


 


Resp  16   11/09/19 10:06


 


BP  96/62   11/09/19 10:06


 


Pulse Ox  98   11/09/19 10:06














- Orders/Labs/Meds


Orders: 


 Active Orders 24 hr











 Category Date Time Status


 


 Patient Status [ADT] Stat ADT  11/09/19 09:13 Active


 


 Cardiac Monitoring [RC] .AS DIRECTED Care  11/09/19 08:21 Active


 


 EKG Documentation Completion [RC] STAT Care  11/09/19 08:21 Active


 


 CULTURE BLOOD [BC] Stat Lab  11/09/19 09:00 Received


 


 CULTURE BLOOD [BC] Stat Lab  11/09/19 09:10 Received


 


 Nitroglycerin [Nitrostat] Med  11/09/19 08:21 Active





 0.4 mg SL Q5M PRN   


 


 Sodium Chloride 0.9% [Normal Saline] 1,000 ml Med  11/09/19 09:15 Active





 IV ASDIRECTED   


 


 Sodium Chloride 0.9% [Normal Saline] 250 ml Med  11/09/19 08:45 Active





 IV ASDIRECTED   


 


 Sodium Chloride 0.9% [Normal Saline] 250 ml Med  11/09/19 09:00 Active





 IV ASDIRECTED   


 


 Sodium Chloride 0.9% [Saline Flush] Med  11/09/19 08:21 Active





 10 ml FLUSH ASDIRECTED PRN   


 


 Sodium Chloride 0.9% [Saline Flush] Med  11/09/19 08:21 Active





 2.5 ml FLUSH ASDIRECTED PRN   


 


 Blood Culture x2 Reflex Set [OM.PC] Stat Oth  11/09/19 08:48 Ordered


 


 Saline Lock Insert [OM.PC] Stat Oth  11/09/19 08:21 Ordered








 Medication Orders





Sodium Chloride (Normal Saline)  250 mls @ 999 mls/hr IV ASDIRECTED [facility]


   Last Admin: 11/09/19 08:39  Dose: 999 mls/hr


Sodium Chloride (Normal Saline)  250 mls @ 999 mls/hr IV ASDIRECTED [facility]


   Last Admin: 11/09/19 08:54  Dose: 999 mls/hr


Sodium Chloride (Normal Saline)  1,000 mls @ 125 mls/hr IV ASDIRECTED [facility]


   Last Infusion: 11/09/19 09:19  Dose: 100 mls/hr


   Admin: 11/09/19 09:14  Dose: 125 mls/hr


Nitroglycerin (Nitrostat)  0.4 mg SL Q5M PRN


   PRN Reason: Chest Pain


Sodium Chloride (Saline Flush)  10 ml FLUSH ASDIRECTED PRN


   PRN Reason: Keep Vein Open


Sodium Chloride (Saline Flush)  2.5 ml FLUSH ASDIRECTED PRN


   PRN Reason: Keep Vein Open








Labs: 


 Laboratory Tests











  11/09/19 11/09/19 11/09/19 Range/Units





  08:22 08:22 08:22 


 


WBC  13.26 H    (4.0-11.0)  K/uL


 


RBC  4.76    (4.50-5.90)  M/uL


 


Hgb  14.0    (13.0-17.0)  g/dL


 


Hct  42.6    (38.0-50.0)  %


 


MCV  89.5    (80.0-98.0)  fL


 


MCH  29.4    (27.0-32.0)  pg


 


MCHC  32.9    (31.0-37.0)  g/dL


 


RDW Std Deviation  50.1    (28.0-62.0)  fl


 


RDW Coeff of Eusebio  15    (11.0-15.0)  %


 


Plt Count  341    (150-400)  K/uL


 


MPV  10.40    (7.40-12.00)  fL


 


Neut % (Auto)  74.0    (48.0-80.0)  %


 


Lymph % (Auto)  12.3 L    (16.0-40.0)  %


 


Mono % (Auto)  11.3    (0.0-15.0)  %


 


Eos % (Auto)  2.1    (0.0-7.0)  %


 


Baso % (Auto)  0.3    (0.0-1.5)  %


 


Neut # (Auto)  9.8 H    (1.4-5.7)  K/uL


 


Lymph # (Auto)  1.6    (0.6-2.4)  K/uL


 


Mono # (Auto)  1.5 H    (0.0-0.8)  K/uL


 


Eos # (Auto)  0.3    (0.0-0.7)  K/uL


 


Baso # (Auto)  0.0    (0.0-0.1)  K/uL


 


Nucleated RBC %  0.0    /100WBC


 


Nucleated RBCs #  0    K/uL


 


INR    2.05  


 


Sodium   142   (136-148)  mmol/L


 


Potassium   4.4   (3.5-5.1)  mmol/L


 


Chloride   104   ()  mmol/L


 


Carbon Dioxide   29.8   (21.0-32.0)  mmol/L


 


BUN   13   (7.0-18.0)  mg/dL


 


Creatinine   1.0   (0.8-1.3)  mg/dL


 


Est Cr Clr Drug Dosing   73.24   mL/min


 


Estimated GFR (MDRD)   > 60.0   ml/min


 


Glucose   127 H   ()  mg/dL


 


Calcium   9.0   (8.5-10.1)  mg/dL


 


Total Bilirubin   1.7 H   (0.2-1.0)  mg/dL


 


AST   54 H   (15-37)  IU/L


 


ALT   110 H   (14-63)  IU/L


 


Alkaline Phosphatase   375 H   ()  U/L


 


Troponin I   < 0.050   (0.000-0.056)  ng/mL


 


Total Protein   8.3 H   (6.4-8.2)  g/dL


 


Albumin   2.6 L   (3.4-5.0)  g/dL


 


Globulin   5.7 H   (2.6-4.0)  g/dL


 


Albumin/Globulin Ratio   0.5 L   (0.9-1.6)  











Meds: 


Medications











Generic Name Dose Route Start Last Admin





  Trade Name Freq  PRN Reason Stop Dose Admin


 


Sodium Chloride  250 mls @ 999 mls/hr  11/09/19 08:45  11/09/19 08:39





  Normal Saline  IV   999 mls/hr





  ASDIRECTED RAMO   Administration





     





     





     





     


 


Sodium Chloride  250 mls @ 999 mls/hr  11/09/19 09:00  11/09/19 08:54





  Normal Saline  IV   999 mls/hr





  ASDIRECTED RAMO   Administration





     





     





     





     


 


Sodium Chloride  1,000 mls @ 125 mls/hr  11/09/19 09:15  11/09/19 09:19





  Normal Saline  IV   100 mls/hr





  ASDIRECTED [facility]O   Infusion





     





     





     





     


 


Nitroglycerin  0.4 mg  11/09/19 08:21  





  Nitrostat  SL   





  Q5M PRN   





  Chest Pain   





     





     





     


 


Sodium Chloride  10 ml  11/09/19 08:21  





  Saline Flush  FLUSH   





  ASDIRECTED PRN   





  Keep Vein Open   





     





     





     


 


Sodium Chloride  2.5 ml  11/09/19 08:21  





  Saline Flush  FLUSH   





  ASDIRECTED PRN   





  Keep Vein Open   





     





     





     














Discontinued Medications














Generic Name Dose Route Start Last Admin





  Trade Name Freq  PRN Reason Stop Dose Admin


 


Sodium Chloride  250 mls @ 999 mls/hr  11/09/19 08:26  11/09/19 08:38





  Normal Saline  IV  11/09/19 08:41  Not Given





  .Bolus ONE   





     





     





     





     


 


Levofloxacin/Dextrose 500 mg/  100 mls @ 100 mls/hr  11/09/19 09:20  11/09/19 09

:29





  Premix  IV  11/09/19 10:19  100 mls/hr





  ONETIME ONE   Administration





     





     





     





     














Departure





- Departure


Time of Disposition: 09:45


Disposition: Refer to Observation


Condition: Good


Clinical Impression: 


 Chest pain








- My Orders


Last 24 Hours: 


My Active Orders





11/09/19 08:21


Cardiac Monitoring [RC] .AS DIRECTED 


EKG Documentation Completion [RC] STAT 


Nitroglycerin [Nitrostat]   0.4 mg SL Q5M PRN 


Sodium Chloride 0.9% [Saline Flush]   10 ml FLUSH ASDIRECTED PRN 


Sodium Chloride 0.9% [Saline Flush]   2.5 ml FLUSH ASDIRECTED PRN 


Saline Lock Insert [OM.PC] Stat 





11/09/19 08:45


Sodium Chloride 0.9% [Normal Saline] 250 ml IV ASDIRECTED 





11/09/19 08:48


Blood Culture x2 Reflex Set [OM.PC] Stat 





11/09/19 09:00


CULTURE BLOOD [BC] Stat 


Sodium Chloride 0.9% [Normal Saline] 250 ml IV ASDIRECTED 





11/09/19 09:10


CULTURE BLOOD [BC] Stat 





11/09/19 09:13


Patient Status [ADT] Stat 





11/09/19 09:15


Sodium Chloride 0.9% [Normal Saline] 1,000 ml IV ASDIRECTED 














- Assessment/Plan


Last 24 Hours: 


My Active Orders





11/09/19 08:21


Cardiac Monitoring [RC] .AS DIRECTED 


EKG Documentation Completion [RC] STAT 


Nitroglycerin [Nitrostat]   0.4 mg SL Q5M PRN 


Sodium Chloride 0.9% [Saline Flush]   10 ml FLUSH ASDIRECTED PRN 


Sodium Chloride 0.9% [Saline Flush]   2.5 ml FLUSH ASDIRECTED PRN 


Saline Lock Insert [OM.PC] Stat 





11/09/19 08:45


Sodium Chloride 0.9% [Normal Saline] 250 ml IV ASDIRECTED 





11/09/19 08:48


Blood Culture x2 Reflex Set [OM.PC] Stat 





11/09/19 09:00


CULTURE BLOOD [BC] Stat 


Sodium Chloride 0.9% [Normal Saline] 250 ml IV ASDIRECTED 





11/09/19 09:10


CULTURE BLOOD [BC] Stat 





11/09/19 09:13


Patient Status [ADT] Stat 





11/09/19 09:15


Sodium Chloride 0.9% [Normal Saline] 1,000 ml IV ASDIRECTED

## 2019-11-09 NOTE — PCM.HP.2
H&P History of Present Illness





- General


Date of Service: 11/09/19


Admit Problem/Dx: 


 Admission Diagnosis/Problem





Admission Diagnosis/Problem      Chest pain











- History of Present Illness


Initial Comments - Free Text/Narative: 





73 yo male with pmh of CAD, Atrial fibrillation on coumadin who woke up this 

morning with complaints of chest pain. He describes the pain as a soreness 

across his chest.  He reports a cough but denies and fevers or chills.  He 

reported that he took Vitamin K this week for an INR of 8.  Patient reports his 

blood pressure is normally in the 80s to 90s systolic


  ** chest


Pain Score (Numeric/FACES): 2





- Related Data


Allergies/Adverse Reactions: 


 Allergies











Allergy/AdvReac Type Severity Reaction Status Date / Time


 


latex Allergy  Itching Verified 11/09/19 10:07


 


oxycodone HCl Allergy  Stomach Verified 11/09/19 10:07





[From OxyContin]   Upset  











Home Medications: 


 Home Meds





Carvedilol [Coreg] 12.5 mg PO BID 03/21/16 [History]


Nitroglycerin [Nitrostat] 0.4 mg PO Q5M PRN 03/21/16 [History]


Warfarin [Coumadin] 2.5 mg PO .MONTHUR 03/21/16 [History]


Warfarin [Coumadin] 5 mg PO .TUESWEDFRISATSUN 03/21/16 [History]


atorvaSTATin [Lipitor] 40 mg PO BEDTIME 03/21/16 [History]


Oxybutynin 5 mg PO QAM 10/28/19 [History]


Clopidogrel [Plavix] 75 mg PO DAILY 11/09/19 [History]


Digoxin [Lanoxin] 250 mcg PO DAILY 11/09/19 [History]











Past Medical History


HEENT History: Reports: None


Cardiovascular History: Reports: High Cholesterol, Hypertension, MI, Stents


Respiratory History: Reports: None


Gastrointestinal History: Reports: None


Other Gastrointestinal History: heartburn if overeating


Genitourinary History: Reports: None


Other Genitourinary History: states has had prostate surgery for cancer in 1999


Musculoskeletal History: Reports: Arthritis


Neurological History: Reports: None


Psychiatric History: Reports: None


Endocrine/Metabolic History: Reports: None


Hematologic History: Reports: None


Immunologic History: Reports: None


Oncologic (Cancer) History: Reports: Prostate


Dermatologic History: Reports: None





- Infectious Disease History


Infectious Disease History: Reports: Chicken Pox





- Past Surgical History


Head Surgeries/Procedures: Reports: None


HEENT Surgical History: Reports: Tonsillectomy


Musculoskeletal Surgical History: Reports: ORIF


Other Musculoskeletal Surgeries/Procedures:: R hip





Social & Family History





- Family History


Family Medical History: Noncontributory





- Tobacco Use


Smoking Status *Q: Former Smoker


Used Tobacco, but Quit: Yes


Month/Year Tobacco Last Used: 1999


Second Hand Smoke Exposure: No





- Caffeine Use


Caffeine Use: Reports: Coffee, Soda





- Alcohol Use


Date of Last Drink: 10/17/19





- Recreational Drug Use


Recreational Drug Use: No





H&P Review of Systems





- Review of Systems:


Review Of Systems: ROS reveals no pertinent complaints other than HPI.





Exam





- Exam


Exam: See Below





- Vital Signs


Vital Signs: 


 Last Vital Signs











Temp  36.6 C   11/09/19 12:00


 


Pulse  101 H  11/09/19 12:00


 


Resp  16   11/09/19 12:00


 


BP  92/63   11/09/19 12:00


 


Pulse Ox  96   11/09/19 12:00











Weight: 108.318 kg





- Exam


General: Alert, Oriented


HEENT: Conjunctiva Clear


Neck: Supple


Lungs: Clear to Auscultation, Normal Respiratory Effort


GI/Abdominal Exam: Soft, Non-Tender


Extremities: Non-Tender, No Pedal Edema


Skin: Warm, Dry, Intact





- Patient Data


Lab Results Last 24 hrs: 


 Laboratory Results - last 24 hr











  11/09/19 11/09/19 11/09/19 Range/Units





  08:22 08:22 08:22 


 


WBC  13.26 H    (4.0-11.0)  K/uL


 


RBC  4.76    (4.50-5.90)  M/uL


 


Hgb  14.0    (13.0-17.0)  g/dL


 


Hct  42.6    (38.0-50.0)  %


 


MCV  89.5    (80.0-98.0)  fL


 


MCH  29.4    (27.0-32.0)  pg


 


MCHC  32.9    (31.0-37.0)  g/dL


 


RDW Std Deviation  50.1    (28.0-62.0)  fl


 


RDW Coeff of Eusebio  15    (11.0-15.0)  %


 


Plt Count  341    (150-400)  K/uL


 


MPV  10.40    (7.40-12.00)  fL


 


Neut % (Auto)  74.0    (48.0-80.0)  %


 


Lymph % (Auto)  12.3 L    (16.0-40.0)  %


 


Mono % (Auto)  11.3    (0.0-15.0)  %


 


Eos % (Auto)  2.1    (0.0-7.0)  %


 


Baso % (Auto)  0.3    (0.0-1.5)  %


 


Neut # (Auto)  9.8 H    (1.4-5.7)  K/uL


 


Lymph # (Auto)  1.6    (0.6-2.4)  K/uL


 


Mono # (Auto)  1.5 H    (0.0-0.8)  K/uL


 


Eos # (Auto)  0.3    (0.0-0.7)  K/uL


 


Baso # (Auto)  0.0    (0.0-0.1)  K/uL


 


Nucleated RBC %  0.0    /100WBC


 


Nucleated RBCs #  0    K/uL


 


INR    2.05  


 


Sodium   142   (136-148)  mmol/L


 


Potassium   4.4   (3.5-5.1)  mmol/L


 


Chloride   104   ()  mmol/L


 


Carbon Dioxide   29.8   (21.0-32.0)  mmol/L


 


BUN   13   (7.0-18.0)  mg/dL


 


Creatinine   1.0   (0.8-1.3)  mg/dL


 


Est Cr Clr Drug Dosing   73.24   mL/min


 


Estimated GFR (MDRD)   > 60.0   ml/min


 


Glucose   127 H   ()  mg/dL


 


Calcium   9.0   (8.5-10.1)  mg/dL


 


Total Bilirubin   1.7 H   (0.2-1.0)  mg/dL


 


AST   54 H   (15-37)  IU/L


 


ALT   110 H   (14-63)  IU/L


 


Alkaline Phosphatase   375 H   ()  U/L


 


Troponin I   < 0.050   (0.000-0.056)  ng/mL


 


Total Protein   8.3 H   (6.4-8.2)  g/dL


 


Albumin   2.6 L   (3.4-5.0)  g/dL


 


Globulin   5.7 H   (2.6-4.0)  g/dL


 


Albumin/Globulin Ratio   0.5 L   (0.9-1.6)  











Result Diagrams: 


 11/09/19 08:22





 11/09/19 08:22


Problem List Initiated/Reviewed/Updated: Yes


Orders Last 24hrs: 


 Active Orders 24 hr











 Category Date Time Status


 


 Patient Status [ADT] Stat ADT  11/09/19 09:13 Active


 


 Cardiac Monitoring [RC] .AS DIRECTED Care  11/09/19 08:21 Active


 


 EKG Documentation Completion [RC] STAT Care  11/09/19 08:21 Active


 


 Telemetry Monitoring [Cardiac Monitoring] [RC] .AS Care  11/09/19 09:13 Active





 DIRECTED   


 


 VTE/DVT Education [RC] PER UNIT ROUTINE Care  11/09/19 13:02 Ordered


 


 Vital Signs [RC] Q4H Care  11/09/19 13:02 Ordered


 


 Heart Healthy Diet [DIET] Diet  11/09/19 Dinner Active


 


 BASIC METABOLIC PANEL,BMP [CHEM] AM Lab  11/10/19 05:11 Ordered


 


 CBC WITH AUTO DIFF [HEME] AM Lab  11/10/19 05:11 Ordered


 


 CULTURE BLOOD [BC] Stat Lab  11/09/19 09:00 Received


 


 CULTURE BLOOD [BC] Stat Lab  11/09/19 09:10 Received


 


 TROPONIN I [CHEM] Q6H Lab  11/09/19 14:00 Ordered


 


 TROPONIN I [CHEM] Q6H Lab  11/09/19 20:00 Ordered


 


 Carvedilol [Coreg] Med  11/09/19 21:00 Ordered





 12.5 mg PO BID   


 


 Clopidogrel [Plavix] Med  11/09/19 13:00 Ordered





 75 mg PO DAILY   


 


 Digoxin [Lanoxin] Med  11/10/19 09:00 Ordered





 250 mcg PO DAILY   


 


 Nitroglycerin [Nitrostat] Med  11/09/19 08:21 Active





 0.4 mg SL Q5M PRN   


 


 Sodium Chloride 0.9% [Normal Saline] 1,000 ml Med  11/09/19 09:15 Active





 IV ASDIRECTED   


 


 Sodium Chloride 0.9% [Normal Saline] 250 ml Med  11/09/19 08:45 Active





 IV ASDIRECTED   


 


 Sodium Chloride 0.9% [Normal Saline] 250 ml Med  11/09/19 09:00 Active





 IV ASDIRECTED   


 


 Sodium Chloride 0.9% [Saline Flush] Med  11/09/19 08:21 Active





 10 ml FLUSH ASDIRECTED PRN   


 


 Sodium Chloride 0.9% [Saline Flush] Med  11/09/19 08:21 Active





 2.5 ml FLUSH ASDIRECTED PRN   


 


 Warfarin [Coumadin] Med  11/10/19 09:00 Ordered





 2.5 mg PO DAILY   


 


 atorvaSTATin [Lipitor] Med  11/09/19 21:00 Ordered





 40 mg PO BEDTIME   


 


 Blood Culture x2 Reflex Set [OM.PC] Stat Oth  11/09/19 08:48 Ordered


 


 Saline Lock Insert [OM.PC] Stat Oth  11/09/19 08:21 Ordered


 


 Resuscitation Status Routine Resus Stat  11/09/19 13:02 Ordered








 Medication Orders





Atorvastatin Calcium (Lipitor)  40 mg PO BEDTIME [facility]


Carvedilol (Coreg)  12.5 mg PO BID [facility]


Clopidogrel Bisulfate (Plavix)  75 mg PO DAILY [facility]


Digoxin (Lanoxin)  250 mcg PO DAILY [facility]


Sodium Chloride (Normal Saline)  250 mls @ 999 mls/hr IV ASDIRECTED [facility]


   Last Admin: 11/09/19 08:39  Dose: 999 mls/hr


Sodium Chloride (Normal Saline)  250 mls @ 999 mls/hr IV ASDIRECTED [facility]


   Last Admin: 11/09/19 08:54  Dose: 999 mls/hr


Sodium Chloride (Normal Saline)  1,000 mls @ 125 mls/hr IV ASDIRECTED [facility]


   Last Infusion: 11/09/19 09:19  Dose: 100 mls/hr


   Admin: 11/09/19 09:14  Dose: 125 mls/hr


Nitroglycerin (Nitrostat)  0.4 mg SL Q5M PRN


   PRN Reason: Chest Pain


Sodium Chloride (Saline Flush)  10 ml FLUSH ASDIRECTED PRN


   PRN Reason: Keep Vein Open


Sodium Chloride (Saline Flush)  2.5 ml FLUSH ASDIRECTED PRN


   PRN Reason: Keep Vein Open


Warfarin Sodium (Coumadin)  2.5 mg PO DAILY [facility]








Assessment/Plan Comment:: 





73 yo male who presented with chest pain.  He is now chest pain free.  Is being 

observed to rule out ACS with serial cardiac enzymes.  He received levaquin for 

concerns of pneumonia due to elevated WBC, CXR and cough.

## 2019-11-09 NOTE — CR
INDICATION:



Chest pain.



TECHNIQUE:



Portable chest.



COMPARISON:



10/28/2019.



FINDINGS:



Heart and mediastinum: No change. There is crowding of the central 

pulmonary vascular markings.



Lungs and pleura: No pneumothorax some stable linear opacities or 

atelectasis at the right lung base.



IMPRESSION:



Stable chest.



Dictated by Adolfo Hopkins MD @ Nov 9 2019  8:45AM



Signed by Dr. Adolfo Hopkins @ Nov 9 2019  8:51AM

## 2019-11-10 NOTE — PCM.DCSUM1
**Discharge Summary





- Discharge Data


Discharge Date: 11/10/19


Discharge Disposition: Home, Self-Care 01


Condition: Good





- Referral to Home Health


Primary Care Physician: 


PCP Unobtainable








- Patient Summary/Data


Hospital Course: 


75 yo male with pmh of CAD, Atrial fibrillation on coumadin who was admitted 

for pneumonia.  HE presented with pleuritic chest pain and cough.  His WBC was 

13,260 and his CXR showed no acute disease.  He was treated with Levaquin. He 

ruled out for acute coronary syndrome with serial negative cardiac enzymes and 

EKG.  He did have episodes of tachycardia with exertion ranging in the 120s to 

140s.  Due to low blood pressure his rate controlling medications were not 

increased.  Today he is requesting discharge.  He was discharged home to have 

follow up with [facility].





- Patient Instructions


Diet: Usual Diet as Tolerated


Notify Provider of: Fever, Swelling and Redness, Nausea and/or Vomiting





- Discharge Plan


Prescriptions/Med Rec: 


Levofloxacin [Levaquin] 500 mg PO DAILY #5 tablet


Home Medications: 


 Home Meds





Carvedilol [Coreg] 12.5 mg PO BID 03/21/16 [History]


Nitroglycerin [Nitrostat] 0.4 mg PO Q5M PRN 03/21/16 [History]


Warfarin [Coumadin] 2.5 mg PO .MONTHUR 03/21/16 [History]


Warfarin [Coumadin] 5 mg PO .TUESWEDFRISATSUN 03/21/16 [History]


atorvaSTATin [Lipitor] 40 mg PO BEDTIME 03/21/16 [History]


Oxybutynin 5 mg PO TID PRN 10/28/19 [History]


Clopidogrel [Plavix] 75 mg PO DAILY 11/09/19 [History]


Digoxin [Lanoxin] 250 mcg PO DAILY 11/09/19 [History]


Levofloxacin [Levaquin] 500 mg PO DAILY #5 tablet 11/10/19 [Rx]








Forms:  ED Department Discharge


Referrals: 


PCP,Unobtain [Primary Care Provider] - 





- Discharge Summary/Plan Comment


DC Time >30 min.: No





- Patient Data


Vitals - Most Recent: 


 Last Vital Signs











Temp  35.9 C   11/10/19 07:43


 


Pulse  117 H  11/10/19 08:20


 


Resp  20   11/10/19 07:43


 


BP  112/80   11/10/19 08:20


 


Pulse Ox  95   11/10/19 07:43











Weight - Most Recent: 108.318 kg


I&O - Last 24 hours: 


 Intake & Output











 11/09/19 11/10/19 11/10/19





 22:59 06:59 14:59


 


Intake Total 1937 1766 


 


Output Total 200 1915 


 


Balance 1737 -149 











Lab Results - Last 24 hrs: 


 Laboratory Results - last 24 hr











  11/09/19 11/09/19 11/10/19 Range/Units





  14:00 20:25 06:35 


 


WBC    10.27  (4.0-11.0)  K/uL


 


RBC    4.24 L  (4.50-5.90)  M/uL


 


Hgb    12.1 L  (13.0-17.0)  g/dL


 


Hct    38.0  (38.0-50.0)  %


 


MCV    89.6  (80.0-98.0)  fL


 


MCH    28.5  (27.0-32.0)  pg


 


MCHC    31.8  (31.0-37.0)  g/dL


 


RDW Std Deviation    49.9  (28.0-62.0)  fl


 


RDW Coeff of Esuebio    15  (11.0-15.0)  %


 


Plt Count    284  (150-400)  K/uL


 


MPV    9.90  (7.40-12.00)  fL


 


Neut % (Auto)    70.7  (48.0-80.0)  %


 


Lymph % (Auto)    16.0  (16.0-40.0)  %


 


Mono % (Auto)    12.0  (0.0-15.0)  %


 


Eos % (Auto)    1.1  (0.0-7.0)  %


 


Baso % (Auto)    0.2  (0.0-1.5)  %


 


Neut # (Auto)    7.3 H  (1.4-5.7)  K/uL


 


Lymph # (Auto)    1.6  (0.6-2.4)  K/uL


 


Mono # (Auto)    1.2 H  (0.0-0.8)  K/uL


 


Eos # (Auto)    0.1  (0.0-0.7)  K/uL


 


Baso # (Auto)    0.0  (0.0-0.1)  K/uL


 


Nucleated RBC %    0.0  /100WBC


 


Nucleated RBCs #    0  K/uL


 


Sodium     (136-148)  mmol/L


 


Potassium     (3.5-5.1)  mmol/L


 


Chloride     ()  mmol/L


 


Carbon Dioxide     (21.0-32.0)  mmol/L


 


BUN     (7.0-18.0)  mg/dL


 


Creatinine     (0.8-1.3)  mg/dL


 


Est Cr Clr Drug Dosing     mL/min


 


Estimated GFR (MDRD)     ml/min


 


Glucose     ()  mg/dL


 


Calcium     (8.5-10.1)  mg/dL


 


Troponin I  < 0.050  < 0.050   (0.000-0.056)  ng/mL














  11/10/19 Range/Units





  06:35 


 


WBC   (4.0-11.0)  K/uL


 


RBC   (4.50-5.90)  M/uL


 


Hgb   (13.0-17.0)  g/dL


 


Hct   (38.0-50.0)  %


 


MCV   (80.0-98.0)  fL


 


MCH   (27.0-32.0)  pg


 


MCHC   (31.0-37.0)  g/dL


 


RDW Std Deviation   (28.0-62.0)  fl


 


RDW Coeff of Eusebio   (11.0-15.0)  %


 


Plt Count   (150-400)  K/uL


 


MPV   (7.40-12.00)  fL


 


Neut % (Auto)   (48.0-80.0)  %


 


Lymph % (Auto)   (16.0-40.0)  %


 


Mono % (Auto)   (0.0-15.0)  %


 


Eos % (Auto)   (0.0-7.0)  %


 


Baso % (Auto)   (0.0-1.5)  %


 


Neut # (Auto)   (1.4-5.7)  K/uL


 


Lymph # (Auto)   (0.6-2.4)  K/uL


 


Mono # (Auto)   (0.0-0.8)  K/uL


 


Eos # (Auto)   (0.0-0.7)  K/uL


 


Baso # (Auto)   (0.0-0.1)  K/uL


 


Nucleated RBC %   /100WBC


 


Nucleated RBCs #   K/uL


 


Sodium  145  (136-148)  mmol/L


 


Potassium  4.7  (3.5-5.1)  mmol/L


 


Chloride  109 H  ()  mmol/L


 


Carbon Dioxide  28.8  (21.0-32.0)  mmol/L


 


BUN  10  (7.0-18.0)  mg/dL


 


Creatinine  0.9  (0.8-1.3)  mg/dL


 


Est Cr Clr Drug Dosing  81.38  mL/min


 


Estimated GFR (MDRD)  > 60.0  ml/min


 


Glucose  106  ()  mg/dL


 


Calcium  8.4 L  (8.5-10.1)  mg/dL


 


Troponin I   (0.000-0.056)  ng/mL











Med Orders - Current: 


 Current Medications





Atorvastatin Calcium (Lipitor)  40 mg PO BEDTIME [facility]


   Last Admin: 11/09/19 20:30 Dose:  40 mg


Carvedilol (Coreg)  12.5 mg PO BID [facility]


   Last Admin: 11/10/19 08:20 Dose:  12.5 mg


Clopidogrel Bisulfate (Plavix)  75 mg PO DAILY [facility]


   Last Admin: 11/10/19 08:21 Dose:  75 mg


Digoxin (Lanoxin)  250 mcg PO DAILY [facility]


   Last Admin: 11/10/19 08:20 Dose:  250 mcg


Sodium Chloride (Normal Saline)  250 mls @ 999 mls/hr IV ASDIRECTED [facility]


   Last Admin: 11/09/19 08:39 Dose:  999 mls/hr


Sodium Chloride (Normal Saline)  250 mls @ 999 mls/hr IV ASDIRECTED [facility]


   Last Admin: 11/09/19 08:54 Dose:  999 mls/hr


Sodium Chloride (Normal Saline)  1,000 mls @ 125 mls/hr IV ASDIRECTED [facility]


   Last Admin: 11/10/19 02:38 Dose:  100 mls/hr


Nitroglycerin (Nitrostat)  0.4 mg SL Q5M PRN


   PRN Reason: Chest Pain


Sodium Chloride (Saline Flush)  10 ml FLUSH ASDIRECTED PRN


   PRN Reason: Keep Vein Open


Sodium Chloride (Saline Flush)  2.5 ml FLUSH ASDIRECTED PRN


   PRN Reason: Keep Vein Open


Warfarin Sodium (Coumadin)  2.5 mg PO DAILY [facility]


   Last Admin: 11/10/19 09:00 Dose:  Not Given





Discontinued Medications





Diltiazem HCl (Diltiazem)  10 mg IVPUSH ONETIME ONE


   Stop: 11/09/19 21:57


   Last Admin: 11/09/19 22:22 Dose:  10 mg


Sodium Chloride (Normal Saline)  250 mls @ 999 mls/hr IV .Bolus ONE


   Stop: 11/09/19 08:41


   Last Admin: 11/09/19 08:38 Dose:  Not Given


Levofloxacin/Dextrose 500 mg/ (Premix)  100 mls @ 100 mls/hr IV ONETIME ONE


   Stop: 11/09/19 10:19


   Last Admin: 11/09/19 09:29 Dose:  100 mls/hr


Warfarin Sodium (Coumadin Ask)  1 each PO DAILY@1400 RAMO


   Last Admin: 11/09/19 18:32 Dose:  Not Given

## 2019-12-11 ENCOUNTER — HOSPITAL ENCOUNTER (EMERGENCY)
Dept: HOSPITAL 56 - MW.ED | Age: 74
Discharge: NURSING, REHAB OR LONG-TERM CARE | End: 2019-12-11
Payer: MEDICARE

## 2019-12-11 VITALS — DIASTOLIC BLOOD PRESSURE: 44 MMHG | SYSTOLIC BLOOD PRESSURE: 77 MMHG | HEART RATE: 89 BPM

## 2019-12-11 DIAGNOSIS — I95.9: ICD-10-CM

## 2019-12-11 DIAGNOSIS — R65.10: ICD-10-CM

## 2019-12-11 DIAGNOSIS — I25.2: ICD-10-CM

## 2019-12-11 DIAGNOSIS — Z79.899: ICD-10-CM

## 2019-12-11 DIAGNOSIS — Z79.02: ICD-10-CM

## 2019-12-11 DIAGNOSIS — K81.0: Primary | ICD-10-CM

## 2019-12-11 DIAGNOSIS — I48.91: ICD-10-CM

## 2019-12-11 DIAGNOSIS — Z91.040: ICD-10-CM

## 2019-12-11 DIAGNOSIS — I10: ICD-10-CM

## 2019-12-11 DIAGNOSIS — Z88.5: ICD-10-CM

## 2019-12-11 LAB
BUN SERPL-MCNC: 15 MG/DL (ref 7–18)
CHLORIDE SERPL-SCNC: 102 MMOL/L (ref 98–107)
CO2 SERPL-SCNC: 30.1 MMOL/L (ref 21–32)
GLUCOSE SERPL-MCNC: 122 MG/DL (ref 74–106)
LIPASE SERPL-CCNC: 133 U/L (ref 73–393)
POTASSIUM SERPL-SCNC: 4.4 MMOL/L (ref 3.5–5.1)
SODIUM SERPL-SCNC: 139 MMOL/L (ref 136–148)

## 2019-12-11 PROCEDURE — 93005 ELECTROCARDIOGRAM TRACING: CPT

## 2019-12-11 PROCEDURE — 83690 ASSAY OF LIPASE: CPT

## 2019-12-11 PROCEDURE — 85025 COMPLETE CBC W/AUTO DIFF WBC: CPT

## 2019-12-11 PROCEDURE — 85610 PROTHROMBIN TIME: CPT

## 2019-12-11 PROCEDURE — 84484 ASSAY OF TROPONIN QUANT: CPT

## 2019-12-11 PROCEDURE — 81003 URINALYSIS AUTO W/O SCOPE: CPT

## 2019-12-11 PROCEDURE — 87040 BLOOD CULTURE FOR BACTERIA: CPT

## 2019-12-11 PROCEDURE — C9113 INJ PANTOPRAZOLE SODIUM, VIA: HCPCS

## 2019-12-11 PROCEDURE — 96365 THER/PROPH/DIAG IV INF INIT: CPT

## 2019-12-11 PROCEDURE — 96375 TX/PRO/DX INJ NEW DRUG ADDON: CPT

## 2019-12-11 PROCEDURE — 99285 EMERGENCY DEPT VISIT HI MDM: CPT

## 2019-12-11 PROCEDURE — 83605 ASSAY OF LACTIC ACID: CPT

## 2019-12-11 PROCEDURE — 80053 COMPREHEN METABOLIC PANEL: CPT

## 2019-12-11 PROCEDURE — 36415 COLL VENOUS BLD VENIPUNCTURE: CPT

## 2019-12-11 PROCEDURE — 96361 HYDRATE IV INFUSION ADD-ON: CPT

## 2019-12-11 PROCEDURE — 74178 CT ABD&PLV WO CNTR FLWD CNTR: CPT

## 2019-12-11 PROCEDURE — 96367 TX/PROPH/DG ADDL SEQ IV INF: CPT

## 2019-12-11 RX ADMIN — SODIUM CHLORIDE STA MLS/HR: 9 INJECTION, SOLUTION INTRAMUSCULAR; INTRAVENOUS; SUBCUTANEOUS at 03:13

## 2019-12-11 RX ADMIN — ONDANSETRON ONE MG: 2 INJECTION, SOLUTION INTRAMUSCULAR; INTRAVENOUS at 01:13

## 2019-12-11 RX ADMIN — ONDANSETRON ONE MG: 2 INJECTION, SOLUTION INTRAMUSCULAR; INTRAVENOUS at 01:10

## 2019-12-11 RX ADMIN — VANCOMYCIN HYDROCHLORIDE ONE: 500 INJECTION, POWDER, LYOPHILIZED, FOR SOLUTION INTRAVENOUS at 04:39

## 2019-12-11 RX ADMIN — ONDANSETRON ONE: 2 INJECTION, SOLUTION INTRAMUSCULAR; INTRAVENOUS at 01:12

## 2019-12-11 RX ADMIN — IOPAMIDOL ONE ML: 755 INJECTION, SOLUTION INTRAVENOUS at 01:59

## 2019-12-11 NOTE — EDM.PDOC
ED HPI GENERAL MEDICAL PROBLEM





- General


Chief Complaint: Abdominal Pain


Stated Complaint: ABDOMINAL PAIN


Time Seen by Provider: 12/11/19 01:47


Source of Information: Reports: Patient





- History of Present Illness


INITIAL COMMENTS - FREE TEXT/NARRATIVE: 





HISTORY AND PHYSICAL:


History of present illness:


[Patient presents with abdominal pain 8 out of 10 on arrival, epigastric pain 

nonradiating





Patient has a history of intermittent abdominal pain over the last month he's 

had a couple of visits here as well as some follow-up in [place] with no 

definitive diagnosis





No fever nausea vomiting chills or sweats no chest pain shortness breath 

headache dizziness or palpitation no bowel or urine symptoms





After prolonged observation here in the ER he did get some tachycardia up to 

123 blood pressure dropped down to 80/50 we did provided to 50 mL bolus and 

began Zosyn I've spoken with Dr. Andreas Kendrick in the ER at essentially [facility] his excepting care /transfer





The patient does have a physician daughter and son-in-law in [place] and they(

Greyson and his wife) are refusing treatment here and were actually wanting to go 

by private vehicle to [place] , however , the drop in blood pressure and 

tachycardia concerning for sepsis . hence, I feel with the patient transferred 

by air  or he will likely have a poor outcome trying to go by private vehicle, 

hence the transfer is made. Patient had refused transfer to [place] or [place] 

and is adamant about traveling to [place] as his daughter and son-in-law live 

there.





]


Review of systems: 


As per history of present illness and below otherwise all systems reviewed and 

negative.


Past medical history: 


As per history of present illness and as reviewed below otherwise 

noncontributory.


Surgical history: 


As per history of present illness and as reviewed below otherwise 

noncontributory.


Social history: 


No reported history of drug or alcohol abuse.


Family history: 


As per history of present illness and as reviewed below otherwise 

noncontributory.





Physical exam:


HEENT: Atraumatic, normocephalic, pupils reactive, negative for conjunctival 

pallor or scleral icterus, mucous membranes moist, throat clear, neck supple, 

nontender, trachea midline.


Lungs: Clear to auscultation, breath sounds equal bilaterally, chest nontender.


Heart: S1S2, regular, negative for clicks, rubs, or JVD.


Abdomen: Soft, nondistended, tender on deep palpation right upper quadrant. 

Negative for masses or hepatosplenomegaly. Negative for costovertebral 

tenderness.


Pelvis: Stable nontender. Left hernia noted


Genitourinary: Deferred.


Rectal: Deferred.


Extremities: Atraumatic, negative for cords or calf pain. Neurovascular 

unremarkable.


Neuro: Awake, alert, oriented. Cranial nerves II through XII unremarkable. 

Cerebellum unremarkable. Motor and sensory unremarkable throughout. Exam 

nonfocal.





Diagnostics:


[CBC CMP UA troponin lipase lactic blood cultures 2 and INR


EKG


CT abdomen pelvis with and without contrast


]


Therapeutics:


[ saline


 Zosyn


] fiend 2 mg IV


Dilaudid 2 mg IV


 Zofran





Impression: 


Systemic inflammatory response syndrome


hypotension


[ acute cholecystitis


Chronic history of baseline


]


Definitive disposition and diagnosis as appropriate pending reevaluation and 

review of above.


  ** Abdominal


Pain Score (Numeric/FACES): 10





- Related Data


 Allergies











Allergy/AdvReac Type Severity Reaction Status Date / Time


 


latex Allergy  Itching Verified 12/11/19 01:01


 


oxycodone HCl Allergy  Stomach Verified 12/11/19 01:01





[From OxyContin]   Upset  











Home Meds: 


 Home Meds





Carvedilol [Coreg] 12.5 mg PO BID 03/21/16 [History]


Nitroglycerin [Nitrostat] 0.4 mg PO Q5M PRN 03/21/16 [History]


Warfarin [Coumadin] 2.5 mg PO .MONTHURS 03/21/16 [History]


Warfarin [Coumadin] 5 mg PO .TUESWEDFRISATSUN 03/21/16 [History]


atorvaSTATin [Lipitor] 40 mg PO BEDTIME 03/21/16 [History]


Oxybutynin 5 mg PO TID PRN 10/28/19 [History]


Clopidogrel [Plavix] 75 mg PO DAILY 11/09/19 [History]


Digoxin [Lanoxin] 250 mcg PO DAILY 11/09/19 [History]











Past Medical History


HEENT History: Reports: None


Cardiovascular History: Reports: Afib, Hypertension, MI, Stents


Respiratory History: Reports: None


Gastrointestinal History: Reports: None


Other Gastrointestinal History: heartburn if overeating


Genitourinary History: Reports: None


Other Genitourinary History: states has had prostate surgery for cancer in 1999


Musculoskeletal History: Reports: None


Neurological History: Reports: None


Psychiatric History: Reports: None


Endocrine/Metabolic History: Reports: None


Insulin Pump Model and : None


Hematologic History: Reports: None


Immunologic History: Reports: None


Oncologic (Cancer) History: Reports: Prostate


Dermatologic History: Reports: None





- Infectious Disease History


Infectious Disease History: Reports: Chicken Pox, Measles





- Past Surgical History


Head Surgeries/Procedures: Reports: None


HEENT Surgical History: Reports: Tonsillectomy


Male  Surgical History: Reports: Prostatectomy


Musculoskeletal Surgical History: Reports: ORIF


Other Musculoskeletal Surgeries/Procedures:: R hip





Social & Family History





- Family History


Family Medical History: Noncontributory





- Tobacco Use


Smoking Status *Q: Never Smoker





- Caffeine Use


Caffeine Use: Reports: Coffee





- Recreational Drug Use


Recreational Drug Use: No





ED ROS GENERAL





- Review of Systems


Review Of Systems: See Below





ED EXAM, GENERAL





- Physical Exam


Exam: See Below





Course





- Vital Signs


Last Recorded V/S: 


 Last Vital Signs











Temp  97.2 F   12/11/19 03:20


 


Pulse  113 H  12/11/19 03:20


 


Resp  16   12/11/19 03:20


 


BP  81/58 L  12/11/19 03:20


 


Pulse Ox  94 L  12/11/19 03:20














- Orders/Labs/Meds


Orders: 


 Active Orders 24 hr











 Category Date Time Status


 


 CULTURE BLOOD [BC] Stat Lab  12/11/19 03:37 Ordered


 


 CULTURE BLOOD [BC] Stat Lab  12/11/19 03:37 Ordered


 


 UA RFX AMY AND CULT IF INDIC [URIN] Stat Lab  12/11/19 01:02 Ordered


 


 Pantoprazole [ProTONIX IV***] 80 mg Med  12/11/19 03:11 Active





 Sodium Chloride 0.9% [Normal Saline] 20 ml   





 IV NOW   


 


 Sodium Chloride 0.9% [Normal Saline] 1,000 ml Med  12/11/19 01:15 Active





 IV ASDIRECTED   


 


 Blood Culture x2 Reflex Set [OM.PC] Stat Oth  12/11/19 03:37 Ordered








 Medication Orders





Sodium Chloride (Normal Saline)  1,000 mls @ 125 mls/hr IV ASDIRECTED RAMO


   Last Admin: 12/11/19 01:09  Dose: 125 mls/hr


Pantoprazole Sodium 80 mg/ (Sodium Chloride)  20 mls @ 10 mls/hr IV NOW STA


   Stop: 12/11/19 05:07


   Last Admin: 12/11/19 03:13  Dose: 10 mls/hr








Labs: 


 Laboratory Tests











  12/11/19 12/11/19 12/11/19 Range/Units





  01:05 01:05 01:05 


 


WBC  19.17 H    (4.0-11.0)  K/uL


 


RBC  5.19    (4.50-5.90)  M/uL


 


Hgb  15.2    (13.0-17.0)  g/dL


 


Hct  44.7    (38.0-50.0)  %


 


MCV  86.1    (80.0-98.0)  fL


 


MCH  29.3    (27.0-32.0)  pg


 


MCHC  34.0    (31.0-37.0)  g/dL


 


RDW Std Deviation  48.8    (28.0-62.0)  fl


 


RDW Coeff of Eusebio  16 H    (11.0-15.0)  %


 


Plt Count  220    (150-400)  K/uL


 


MPV  10.20    (7.40-12.00)  fL


 


Add Manual Diff  YES    


 


Neutrophils % (Manual)  82 H    (48.0-80.0)  %


 


Lymphocytes % (Manual)  8 L    (16.0-40.0)  %


 


Monocytes % (Manual)  9    (0.0-15.0)  %


 


Basophils % (Manual)  1    (0.0-1.5)  %


 


Absolute Seg Neuts  15.7 H    (1.4-5.7)  


 


Lymphocytes # (Manual)  1.5    (0.6-2.4)  


 


Monocytes # (Manual)  1.7 H    (0.0-0.8)  


 


Basophils # (Manual)  0.2 H    (0.0-0.1)  


 


INR    2.67  


 


Sodium   139   (136-148)  mmol/L


 


Potassium   4.4   (3.5-5.1)  mmol/L


 


Chloride   102   ()  mmol/L


 


Carbon Dioxide   30.1   (21.0-32.0)  mmol/L


 


BUN   15   (7.0-18.0)  mg/dL


 


Creatinine   1.1   (0.8-1.3)  mg/dL


 


Est Cr Clr Drug Dosing   66.58   mL/min


 


Estimated GFR (MDRD)   > 60.0   ml/min


 


Glucose   122 H   ()  mg/dL


 


Calcium   9.9   (8.5-10.1)  mg/dL


 


Total Bilirubin   1.8 H   (0.2-1.0)  mg/dL


 


AST   16   (15-37)  IU/L


 


ALT   26   (14-63)  IU/L


 


Alkaline Phosphatase   135 H   ()  U/L


 


Troponin I   < 0.050   (0.000-0.056)  ng/mL


 


Total Protein   8.2   (6.4-8.2)  g/dL


 


Albumin   3.3 L   (3.4-5.0)  g/dL


 


Globulin   4.9 H   (2.6-4.0)  g/dL


 


Albumin/Globulin Ratio   0.7 L   (0.9-1.6)  


 


Lipase   133   ()  U/L











Meds: 


Medications











Generic Name Dose Route Start Last Admin





  Trade Name Freq  PRN Reason Stop Dose Admin


 


Sodium Chloride  1,000 mls @ 125 mls/hr  12/11/19 01:15  12/11/19 01:09





  Normal Saline  IV   125 mls/hr





  ASDIRECTED RAMO   Administration





     





     





     





     


 


Pantoprazole Sodium 80 mg/  20 mls @ 10 mls/hr  12/11/19 03:11  12/11/19 03:13





  Sodium Chloride  IV  12/11/19 05:07  10 mls/hr





  NOW STA   Administration





     





     





     





     














Discontinued Medications














Generic Name Dose Route Start Last Admin





  Trade Name Freq  PRN Reason Stop Dose Admin


 


Hydromorphone HCl  2 mg  12/11/19 01:26  12/11/19 01:28





  Dilaudid  IVPUSH  12/11/19 01:27  2 mg





  ONETIME ONE   Administration





     





     





     





     


 


Hydromorphone HCl  Confirm  12/11/19 01:26  12/11/19 01:30





  Dilaudid  Administered  12/11/19 01:27  Not Given





  Dose   





  2 mg   





  .ROUTE   





  .STK-MED ONE   





     





     





     





     


 


Pantoprazole Sodium 80 mg/  100 mls @ 10 mls/hr  12/11/19 02:00  





  Sodium Chloride  IV   





  .Continuous RAMO   





     





     





     





     


 


Iopamidol  100 ml  12/11/19 01:55  





  Isovue Multipack-370 (76%)  IVPUSH  12/11/19 01:56  





  ONETIME STA   





     





     





     





     


 


Iopamidol  100 ml  12/11/19 01:58  12/11/19 01:59





  Isovue-370 (76%)  IVPUSH  12/11/19 01:59  100 ml





  ONETIME ONE   Administration





     





     





     





     


 


Morphine Sulfate  4 mg  12/11/19 01:02  12/11/19 01:10





  Morphine  IVPUSH  12/11/19 01:03  4 mg





  ONETIME ONE   Administration





     





     





     





     


 


Ondansetron HCl  4 mg  12/11/19 01:03  12/11/19 01:10





  Zofran  IVPUSH  12/11/19 01:04  4 mg





  ONETIME ONE   Administration





     





     





     





     


 


Ondansetron HCl  Confirm  12/11/19 01:06  12/11/19 01:12





  Zofran  Administered  12/11/19 01:07  Not Given





  Dose   





  8 mg   





  .ROUTE   





  .STK-MED ONE   





     





     





     





     


 


Ondansetron HCl  4 mg  12/11/19 01:11  12/11/19 01:13





  Zofran  IVPUSH  12/11/19 01:12  4 mg





  ONETIME ONE   Administration





     





     





     





     


 


Pantoprazole Sodium  Confirm  12/11/19 03:00  12/11/19 03:16





  Protonix Iv***  Administered  12/11/19 03:01  Not Given





  Dose   





  80 mg   





  .ROUTE   





  .STK-MED ONE   





     





     





     





     














Departure





- Departure


Time of Disposition: 03:53


Disposition: DC/Tfer to Acute Hospital [facility]


Condition: Serious


Clinical Impression: 


 Abdominal pain, Cholecystitis, Systemic inflammatory response syndrome








- Discharge Information


Referrals: 


Del Carmona MD [Primary Care Provider] - 


Forms:  ED Department Discharge





Sepsis Event Note





- Evaluation


Sepsis Screening Result: No Definite Risk





- Focused Exam


Vital Signs: 


 Vital Signs











  Temp Pulse Resp BP Pulse Ox


 


 12/11/19 03:20  97.2 F  113 H  16  81/58 L  94 L


 


 12/11/19 02:25   123 H  16  107/65  90 L


 


 12/11/19 01:01  96.4 F  102 H  18  151/73 H  96











Date Exam was Performed: 12/11/19


Time Exam was Performed: 03:46





- My Orders


Last 24 Hours: 


My Active Orders





12/11/19 01:02


UA RFX AMY AND CULT IF INDIC [URIN] Stat 





12/11/19 01:15


Sodium Chloride 0.9% [Normal Saline] 1,000 ml IV ASDIRECTED 





12/11/19 03:11


Pantoprazole [ProTONIX IV***] 80 mg   Sodium Chloride 0.9% [Normal Saline] 20 

ml IV NOW 





12/11/19 03:37


CULTURE BLOOD [BC] Stat 


CULTURE BLOOD [BC] Stat 


Blood Culture x2 Reflex Set [OM.PC] Stat 














- Assessment/Plan


Last 24 Hours: 


My Active Orders





12/11/19 01:02


UA RFX AMY AND CULT IF INDIC [URIN] Stat 





12/11/19 01:15


Sodium Chloride 0.9% [Normal Saline] 1,000 ml IV ASDIRECTED 





12/11/19 03:11


Pantoprazole [ProTONIX IV***] 80 mg   Sodium Chloride 0.9% [Normal Saline] 20 

ml IV NOW 





12/11/19 03:37


CULTURE BLOOD [BC] Stat 


CULTURE BLOOD [BC] Stat 


Blood Culture x2 Reflex Set [OM.PC] Stat

## 2019-12-11 NOTE — CT
INDICATION:



Abdominal pain, nausea and vomiting.



TECHNIQUE:



CT abdomen and pelvis acquired with initial noncontrast followed by 100 cc 

of Isovue 370 intravenous contrast.



COMPARISON:



None. 



FINDINGS:



Lower chest: Trace right pleural effusion with bibasilar discoid 

atelectasis. Coronary atherosclerosis. 



Liver: Calcifications within the liver consistent with old granulomatous 

disease. 



Gallbladder and bile ducts: Gallbladder wall thickening with mild 

pericholecystic inflammation and mild distention of the gallbladder. 



Pancreas: Unremarkable. No mass or inflammation. 



Spleen: Splenic calcifications consistent with old granulomatous disease. 



Adrenal glands: Unremarkable. No nodules. 



Kidneys: Symmetric renal enhancement without hydronephrosis. Subcentimeter 

hypodensity at the lower pole of the right kidney which is too small for 

characterization, likely a renal cyst. 



GI tract: The stomach is unremarkable. No dilated loops of large or small 

intestine.



Vasculature: Atherosclerosis without abdominal aortic aneurysm. 



Omentum/Peritoneum/Abdominal Wall: Left indirect inguinal hernia with 

sigmoid colon extending into the hernia sac without evidence of 

obstruction. 



Pelvis: Trace free fluid in the deep pelvis. Status post prostatectomy with 

surgical clips within the pelvis. 



Bones: Status post right total hip replacement. Ankylosis lower thoracic 

spine. 



IMPRESSION:



1. Gallbladder wall thickening with mild pericholecystic inflammation and 

distention suggesting cholecystitis. 



2. Left indirect inguinal hernia with sigmoid colon in the hernia sac 

without evidence of obstruction. 



3. Trace right pleural effusion with basilar discoid atelectasis. 



4. Other incidental findings as noted above.



Please note that all CT scans at this facility use dose modulation, 

iterative reconstruction, and/or weight-based dosing when appropriate to 

reduce radiation dose to as low as reasonably achievable.



Dictated by Mauricio Mario MD @ Dec 11 2019  2:37AM



Signed by Dr. Mauricio Mario @ Dec 11 2019  2:47AM